# Patient Record
Sex: FEMALE | Race: WHITE | Employment: FULL TIME | URBAN - METROPOLITAN AREA
[De-identification: names, ages, dates, MRNs, and addresses within clinical notes are randomized per-mention and may not be internally consistent; named-entity substitution may affect disease eponyms.]

---

## 2019-04-24 PROBLEM — E66.01 OBESITY, MORBID (HCC): Status: ACTIVE | Noted: 2019-04-24

## 2020-10-15 NOTE — PROGRESS NOTES
Score is zero, no evidence of blockage, let me know if she wants something else for anxiety or higher dose of her effexor, often times when patients have reassurance it is not the heart, the pain eases off.

## 2020-10-15 NOTE — PROGRESS NOTES
cxr negative, likely chest wall (muscular pain) and not related to heart, see also note on calcium score, it was good news

## 2021-03-02 ENCOUNTER — TRANSCRIBE ORDER (OUTPATIENT)
Dept: SCHEDULING | Age: 41
End: 2021-03-02

## 2021-03-02 DIAGNOSIS — Z12.31 SCREENING MAMMOGRAM FOR HIGH-RISK PATIENT: Primary | ICD-10-CM

## 2021-03-22 ENCOUNTER — HOSPITAL ENCOUNTER (OUTPATIENT)
Dept: MAMMOGRAPHY | Age: 41
Discharge: HOME | End: 2021-03-22
Attending: FAMILY MEDICINE
Payer: COMMERCIAL

## 2021-03-22 DIAGNOSIS — Z12.31 SCREENING MAMMOGRAM FOR HIGH-RISK PATIENT: ICD-10-CM

## 2021-03-22 PROCEDURE — 77067 SCR MAMMO BI INCL CAD: CPT

## 2022-03-19 PROBLEM — E66.01 OBESITY, MORBID (HCC): Status: ACTIVE | Noted: 2019-04-24

## 2022-08-17 ENCOUNTER — OFFICE VISIT (OUTPATIENT)
Dept: FAMILY MEDICINE CLINIC | Facility: CLINIC | Age: 42
End: 2022-08-17
Payer: COMMERCIAL

## 2022-08-17 VITALS
HEIGHT: 63 IN | SYSTOLIC BLOOD PRESSURE: 132 MMHG | WEIGHT: 248 LBS | BODY MASS INDEX: 43.94 KG/M2 | DIASTOLIC BLOOD PRESSURE: 78 MMHG

## 2022-08-17 DIAGNOSIS — K21.9 GASTRO-ESOPHAGEAL REFLUX DISEASE WITHOUT ESOPHAGITIS: ICD-10-CM

## 2022-08-17 DIAGNOSIS — G43.C0 PERIODIC HEADACHE SYNDROME, NOT INTRACTABLE: ICD-10-CM

## 2022-08-17 DIAGNOSIS — E78.00 PURE HYPERCHOLESTEROLEMIA, UNSPECIFIED: ICD-10-CM

## 2022-08-17 DIAGNOSIS — E55.9 VITAMIN D DEFICIENCY, UNSPECIFIED: ICD-10-CM

## 2022-08-17 DIAGNOSIS — E66.01 OBESITY, MORBID (HCC): ICD-10-CM

## 2022-08-17 DIAGNOSIS — F41.1 GENERALIZED ANXIETY DISORDER: ICD-10-CM

## 2022-08-17 DIAGNOSIS — I10 ESSENTIAL HYPERTENSION, BENIGN: Primary | ICD-10-CM

## 2022-08-17 DIAGNOSIS — E11.9 TYPE 2 DIABETES MELLITUS WITHOUT COMPLICATION, WITHOUT LONG-TERM CURRENT USE OF INSULIN (HCC): ICD-10-CM

## 2022-08-17 DIAGNOSIS — J30.1 SEASONAL ALLERGIC RHINITIS DUE TO POLLEN: ICD-10-CM

## 2022-08-17 PROCEDURE — 99214 OFFICE O/P EST MOD 30 MIN: CPT | Performed by: FAMILY MEDICINE

## 2022-08-17 PROCEDURE — 3051F HG A1C>EQUAL 7.0%<8.0%: CPT | Performed by: FAMILY MEDICINE

## 2022-08-17 RX ORDER — SEMAGLUTIDE 1.34 MG/ML
INJECTION, SOLUTION SUBCUTANEOUS
Status: CANCELLED | OUTPATIENT
Start: 2022-08-17

## 2022-08-17 RX ORDER — CLORAZEPATE DIPOTASSIUM 7.5 MG/1
7.5 TABLET ORAL PRN
Qty: 90 TABLET | Refills: 1 | Status: SHIPPED | OUTPATIENT
Start: 2022-08-17 | End: 2022-11-15

## 2022-08-17 RX ORDER — SEMAGLUTIDE 1.34 MG/ML
INJECTION, SOLUTION SUBCUTANEOUS
COMMUNITY
Start: 2022-06-01 | End: 2022-08-17

## 2022-08-17 RX ORDER — LOSARTAN POTASSIUM AND HYDROCHLOROTHIAZIDE 25; 100 MG/1; MG/1
1 TABLET ORAL DAILY
Qty: 30 TABLET | Refills: 0 | Status: SHIPPED | OUTPATIENT
Start: 2022-08-17 | End: 2022-08-17 | Stop reason: SDUPTHER

## 2022-08-17 RX ORDER — ESOMEPRAZOLE MAGNESIUM 40 MG/1
40 CAPSULE, DELAYED RELEASE ORAL DAILY
Qty: 90 CAPSULE | Refills: 1 | Status: SHIPPED | OUTPATIENT
Start: 2022-08-17

## 2022-08-17 RX ORDER — ESOMEPRAZOLE MAGNESIUM 40 MG/1
40 CAPSULE, DELAYED RELEASE ORAL DAILY
Qty: 30 CAPSULE | Refills: 0 | Status: SHIPPED | OUTPATIENT
Start: 2022-08-17 | End: 2022-08-17 | Stop reason: SDUPTHER

## 2022-08-17 RX ORDER — CLORAZEPATE DIPOTASSIUM 7.5 MG/1
7.5 TABLET ORAL PRN
Qty: 30 TABLET | Refills: 0 | Status: SHIPPED | OUTPATIENT
Start: 2022-08-17 | End: 2022-08-17 | Stop reason: SDUPTHER

## 2022-08-17 RX ORDER — VENLAFAXINE HYDROCHLORIDE 150 MG/1
150 CAPSULE, EXTENDED RELEASE ORAL 2 TIMES DAILY
Qty: 90 CAPSULE | Refills: 1 | Status: SHIPPED | OUTPATIENT
Start: 2022-08-17

## 2022-08-17 RX ORDER — VENLAFAXINE HYDROCHLORIDE 150 MG/1
150 CAPSULE, EXTENDED RELEASE ORAL 2 TIMES DAILY
Qty: 30 CAPSULE | Refills: 0 | Status: SHIPPED | OUTPATIENT
Start: 2022-08-17 | End: 2022-08-17 | Stop reason: SDUPTHER

## 2022-08-17 RX ORDER — LOSARTAN POTASSIUM AND HYDROCHLOROTHIAZIDE 25; 100 MG/1; MG/1
1 TABLET ORAL DAILY
Qty: 90 TABLET | Refills: 1 | Status: SHIPPED | OUTPATIENT
Start: 2022-08-17

## 2022-08-17 RX ORDER — RIZATRIPTAN BENZOATE 10 MG/1
10 TABLET, ORALLY DISINTEGRATING ORAL
Qty: 10 TABLET | Refills: 3 | Status: SHIPPED | OUTPATIENT
Start: 2022-08-17 | End: 2022-08-17

## 2022-08-17 ASSESSMENT — ENCOUNTER SYMPTOMS
ABDOMINAL DISTENTION: 0
COUGH: 0
ANAL BLEEDING: 0
BLURRED VISION: 0
EYE ITCHING: 0
SINUS PRESSURE: 0
EYE DISCHARGE: 0
CHEST TIGHTNESS: 0
FACIAL SWELLING: 0
BACK PAIN: 0
BLOOD IN STOOL: 0
EYE PAIN: 0
RHINORRHEA: 0
SINUS PAIN: 0
EYE REDNESS: 0
ABDOMINAL PAIN: 0
APNEA: 0

## 2022-08-17 ASSESSMENT — PATIENT HEALTH QUESTIONNAIRE - PHQ9
2. FEELING DOWN, DEPRESSED OR HOPELESS: 0
1. LITTLE INTEREST OR PLEASURE IN DOING THINGS: 0
SUM OF ALL RESPONSES TO PHQ QUESTIONS 1-9: 0
SUM OF ALL RESPONSES TO PHQ QUESTIONS 1-9: 0
SUM OF ALL RESPONSES TO PHQ9 QUESTIONS 1 & 2: 0
SUM OF ALL RESPONSES TO PHQ QUESTIONS 1-9: 0
SUM OF ALL RESPONSES TO PHQ QUESTIONS 1-9: 0

## 2022-08-17 NOTE — PROGRESS NOTES
[address] [place] [address]  _______________________________________  Loree Shaw MD                 [address] [place] [address],  [address] MD Adithya                     [facility]                                                                                    Phone: [phone]                                                                                    Fax: [phone]    Chad Hauser is a 43 y.o. female who is seen for evaluation of   Chief Complaint   Patient presents with    Hypertension    Diabetes    Gastroesophageal Reflux       HPI:   Hypertension  This is a chronic problem. Progression since onset: Hypertension controlled medication needs refills recheck labs. Associated symptoms include headaches. Pertinent negatives include no blurred vision, chest pain or palpitations. There are no associated agents to hypertension. Diabetes  She presents for her follow-up diabetic visit. She has type 2 diabetes mellitus. Disease course: Diabetes controlled medication needs refills recheck labs. Hypoglycemia symptoms include headaches. Pertinent negatives for hypoglycemia include no confusion, dizziness, nervousness/anxiousness or pallor. Pertinent negatives for diabetes include no blurred vision, no chest pain, no fatigue, no foot ulcerations, no polydipsia and no polyphagia. Gastroesophageal Reflux  She reports no abdominal pain, no chest pain or no coughing. Chronicity: Acid reflux controlled medication needs refills recheck labs. Pertinent negatives include no fatigue. Other  Episode frequency: Obesity, patient continues to work on diet and exercise, details below, discussed in length. Associated symptoms include headaches. Pertinent negatives include no abdominal pain, arthralgias, chest pain, chills, congestion, coughing, diaphoresis, fatigue, fever, joint swelling, myalgias or rash. Headache   This is a recurrent problem.  Episode onset: Patient having more severe headaches in the last few months, migraines, she uses Excedrin and has to take nausea medicines. Pertinent negatives include no abdominal pain, back pain, blurred vision, coughing, dizziness, ear pain, eye pain, eye redness, fever, hearing loss, rhinorrhea or sinus pressure. Chief Complaint   Patient presents with    Hypertension    Diabetes    Gastroesophageal Reflux         Review of Systems:    Review of Systems   Constitutional:  Negative for activity change, appetite change, chills, diaphoresis, fatigue and fever. HENT:  Negative for congestion, ear discharge, ear pain, facial swelling, hearing loss, mouth sores, rhinorrhea, sinus pressure and sinus pain. Eyes:  Negative for blurred vision, pain, discharge, redness and itching. Respiratory:  Negative for apnea, cough and chest tightness. Cardiovascular:  Negative for chest pain, palpitations and leg swelling. Gastrointestinal:  Negative for abdominal distention, abdominal pain, anal bleeding and blood in stool. Endocrine: Negative for cold intolerance, heat intolerance, polydipsia and polyphagia. Genitourinary:  Negative for difficulty urinating, dysuria, enuresis, flank pain, frequency and hematuria. Musculoskeletal:  Negative for arthralgias, back pain, gait problem, joint swelling and myalgias. Skin:  Negative for pallor and rash. Neurological:  Positive for headaches. Negative for dizziness, facial asymmetry and light-headedness. Psychiatric/Behavioral:  Negative for agitation, behavioral problems, confusion and sleep disturbance. The patient is not nervous/anxious.       History:  Past Medical History:   Diagnosis Date    Acid reflux     Acute bacterial sinusitis     Allergic rhinitis due to pollen     Anemia     hx of    Anxiety state, unspecified     Asthma     inhaler PRN    Chronic sinusitis     Cough     Deviated nasal septum     Endometriosis     Essential hypertension, benign     no medications at this time    Fibrocystic breast     GERD (gastroesophageal reflux disease)     managed with medication    Headache     High blood pressure     Hypertrophy of nasal turbinates     Nasal obstruction     Nausea & vomiting     Other ill-defined conditions(349.89)     current sinus infection of 10 week duration    Ovarian cyst     PCOS (polycystic ovarian syndrome)     Pelvic inflammation in female     Pneumonia 2002    RLL, hospitalized    Preeclampsia     Sinus pain     Thyroid disease     reports small nodule - no medication or surgery    Unspecified adverse effect of anesthesia     difficulty voiding after having anesthesia    URI, acute     UTI (urinary tract infection)        Past Surgical History:   Procedure Laterality Date    CHOLECYSTECTOMY      COLONOSCOPY      normal no polyps, Dr Pierce [place], [address] (CERVIX STATUS UNKNOWN)      PELVIC LAPAROSCOPY      SEPTOPLASTY  1999?    septoplasty w/ bone spur removal, turbinates shaved;  [facility]       Family History   Problem Relation Age of Onset    Colon Cancer Mother     Cancer Mother         pancreatic cancer    High Cholesterol Mother     Breast Cancer Other 28        mat cousin    Lung Disease Neg Hx     Breast Cancer Maternal Aunt     Heart Disease Father        Social History     Tobacco Use    Smoking status: Never    Smokeless tobacco: Never   Substance Use Topics    Alcohol use: No       Allergies   Allergen Reactions    Leuprolide Anaphylaxis and Swelling     Difficulty breathing.     Aspirin Other (See Comments)     Childhood allergy- can take now    Erythromycin Other (See Comments)     As a chil, allergy to Mycin family    Hydromorphone Itching    Penicillins Other (See Comments)     Can take augmentin without difficulty    Sulfa Antibiotics Other (See Comments)     As a child    Sulfamethoxazole-Trimethoprim Other (See Comments)       Current Outpatient Medications   Medication Sig Dispense Refill clorazepate (TRANXENE) 7.5 MG tablet Take 1 tablet by mouth as needed for Anxiety for up to 30 days. 30 tablet 0    esomeprazole (NEXIUM) 40 MG delayed release capsule Take 1 capsule by mouth daily 30 capsule 0    losartan-hydroCHLOROthiazide (HYZAAR) 100-25 MG per tablet Take 1 tablet by mouth daily TAKE 1 TABLET DAILY 30 tablet 0    metFORMIN (GLUCOPHAGE) 500 MG tablet Take 1 tablet by mouth daily (with breakfast) 60 tablet 0    venlafaxine (EFFEXOR XR) 150 MG extended release capsule Take 1 capsule by mouth 2 times daily 30 capsule 0    Semaglutide, 2 MG/DOSE, 8 MG/3ML SOPN Inject 2 mg into the skin once a week 3 mL 2    rizatriptan (MAXALT-MLT) 10 MG disintegrating tablet Take 1 tablet by mouth once as needed for Migraine May repeat in 2 hours if needed 10 tablet 3    cetirizine (ZYRTEC) 10 MG tablet Take by mouth      vitamin D 25 MCG (1000 UT) CAPS Take by mouth daily       No current facility-administered medications for this visit. Vitals:    /78   Ht 5' 3\" (1.6 m)   Wt 248 lb (112.5 kg)   BMI 43.93 kg/m²     Physical Exam:  Physical Exam  Constitutional:       Appearance: Normal appearance. She is obese. She is not ill-appearing or diaphoretic. HENT:      Head: Normocephalic. Right Ear: Tympanic membrane normal.      Left Ear: Tympanic membrane normal.      Nose: No congestion or rhinorrhea. Cardiovascular:      Rate and Rhythm: Normal rate and regular rhythm. Pulses: Normal pulses. Heart sounds: Normal heart sounds. Pulmonary:      Effort: Pulmonary effort is normal.      Breath sounds: Normal breath sounds. Musculoskeletal:         General: Normal range of motion. Cervical back: Normal range of motion and neck supple. Skin:     General: Skin is warm and dry. Neurological:      Mental Status: She is alert and oriented to person, place, and time. Assessment/Plan:     ICD-10-CM    1.  Essential hypertension, benign  I10 losartan-hydroCHLOROthiazide (HYZAAR) 100-25 MG per tablet      2. Seasonal allergic rhinitis due to pollen  J30.1       3. Obesity, morbid ([address]  E66.01       4. Type 2 diabetes mellitus without complication, without long-term current use of insulin (HCC)  E11.9 metFORMIN (GLUCOPHAGE) 500 MG tablet     Semaglutide, 2 MG/DOSE, 8 MG/3ML SOPN      5. Generalized anxiety disorder  F41.1 clorazepate (TRANXENE) 7.5 MG tablet     venlafaxine (EFFEXOR XR) 150 MG extended release capsule      6. Gastro-esophageal reflux disease without esophagitis  K21.9 esomeprazole (NEXIUM) 40 MG delayed release capsule      7. Pure hypercholesterolemia, unspecified  E78.00       8. Vitamin D deficiency, unspecified  E55.9       Patient was given samples and prescription for Aimovig today, also prescription for Maxalt 10 mg MLT with instructions for use of all. She demonstrated appropriate use of the first shot of the Hemabate in the office.   Meds sent, we are increasing ozempic to 2 mg just discontinue  Labs sent  Encourage diet and exercise   Encourage weight loss,  Encourage home sugar monitoring  Call if problems  Recheck 3 months     Erica Grier MD

## 2022-08-24 ENCOUNTER — HOSPITAL ENCOUNTER (OUTPATIENT)
Dept: MAMMOGRAPHY | Age: 42
Discharge: HOME | End: 2022-08-27
Payer: COMMERCIAL

## 2022-08-24 DIAGNOSIS — Z12.31 VISIT FOR SCREENING MAMMOGRAM: ICD-10-CM

## 2022-08-24 PROCEDURE — 77067 SCR MAMMO BI INCL CAD: CPT

## 2022-09-14 ENCOUNTER — OFFICE VISIT (OUTPATIENT)
Dept: ORTHOPEDIC SURGERY | Age: 42
End: 2022-09-14
Payer: COMMERCIAL

## 2022-09-14 DIAGNOSIS — M25.562 LEFT KNEE PAIN, UNSPECIFIED CHRONICITY: Primary | ICD-10-CM

## 2022-09-14 PROCEDURE — 99203 OFFICE O/P NEW LOW 30 MIN: CPT | Performed by: SPECIALIST/TECHNOLOGIST

## 2022-09-14 RX ORDER — COVID-19 ANTIGEN TEST
KIT MISCELLANEOUS
COMMUNITY
Start: 2022-08-23

## 2022-09-14 RX ORDER — LANCETS
EACH MISCELLANEOUS
COMMUNITY
Start: 2022-08-18

## 2022-09-14 RX ORDER — DICLOFENAC SODIUM 75 MG/1
75 TABLET, DELAYED RELEASE ORAL 2 TIMES DAILY
Qty: 60 TABLET | Refills: 0 | Status: SHIPPED | OUTPATIENT
Start: 2022-09-14

## 2022-09-14 RX ORDER — ONDANSETRON 8 MG/1
TABLET, ORALLY DISINTEGRATING ORAL
COMMUNITY
Start: 2022-08-09

## 2022-09-14 NOTE — PROGRESS NOTES
Name: Mitzi Cornell  YOB: 1980  Gender: female  MRN: [id number]      CC: New Patient (L knee pain)       HPI: Mitzi Cornell is a 43 y.o. female who presents with New Patient (L knee pain)  With 3-1/2 months of left knee pain with no mechanism of injury. She reports swelling and stiffness in the knee and tenderness to touch. She reports the pain is constant and achy. She reports that she has difficulty with flexing and crossing her legs. She reports that she has pain with deep squat and going up and down stairs. She reports that she has had physical therapy and cortisone injections in the past with good relief but this was several years ago. ROS/Meds/PSH/PMH/FH/SH: I personally reviewed the patients standard intake form. Below are the pertinents    Tobacco:  reports that she has never smoked. She has never used smokeless tobacco.  Diabetes: diabetic-non insulin  Other: HTN, GERD, PCOS, thyroid disease    Physical Examination:  General: no acute distress  Lungs: breathing easily  CV: regular rhythm by pulse  Left Knee: Moderate effusion present. Tenderness to palpation of the medial joint line. Full active and passive range of motion with pain in the extremes. Negative ligamentous exam x4. Positive Talia's with reproduction of patient's symptoms in the medial joint line. Positive bounce home test.      Imaging:   Knee XR: 4 views     Clinical Indication  1. Left knee pain, unspecified chronicity           Report: AP, lateral, PA flexion, sunrise views of the Left knee demonstrates no acute fracture dislocation, or advanced degenerative changes    Impression: No acute findings as above            All imaging interpreted by myself SERVANDO Jaimes independent of radiology review        Assessment:   1. Left knee pain, unspecified chronicity         Plan: Think the majority of the patient's left knee pain is due to a meniscal pathology.   I discussed with the patient conservative treatment options to include corticosteroid injection, formal physical therapy and prescription NSAIDs. I think based on the fact that she is having mechanical symptoms and with findings on clinical exam it is reasonable to forego any corticosteroid injection or formal physical therapy at this time. I will prescribe her 75 mg diclofenac twice daily for her pain and swelling. At this time I will order an MRI to evaluate this further and she will return after MRI for definitive treatment.         Chas De La Vega MS, APRN, FNP-BC  Orthopaedics and Sports Medicine

## 2022-09-21 ENCOUNTER — OFFICE VISIT (OUTPATIENT)
Dept: ORTHOPEDIC SURGERY | Age: 42
End: 2022-09-21
Payer: COMMERCIAL

## 2022-09-21 DIAGNOSIS — M25.562 LEFT KNEE PAIN, UNSPECIFIED CHRONICITY: Primary | ICD-10-CM

## 2022-09-21 DIAGNOSIS — S83.242D TEAR OF MEDIAL MENISCUS OF LEFT KNEE, CURRENT, UNSPECIFIED TEAR TYPE, SUBSEQUENT ENCOUNTER: ICD-10-CM

## 2022-09-21 PROCEDURE — 99214 OFFICE O/P EST MOD 30 MIN: CPT | Performed by: SPECIALIST/TECHNOLOGIST

## 2022-09-21 NOTE — PROGRESS NOTES
Name: Khadar Cole  YOB: 1980  Gender: female  MRN: [id number]      CC: Follow-up (L knee MRI f/u)       HPI: Khadar Cole is a 43 y.o. female who returns for a follow up with MRI results on left knee pain. She reports continued pain with no improvement. Physical Examination:  General: no acute distress  Lungs: breathing easily  CV: regular rhythm by pulse  Left Knee: Minimal effusion present. Tenderness to palpation of the medial joint line. Full active and passive range of motion with pain in the extremes. Negative ligamentous exam x4. Positive Talia's with reproduction of patient's symptoms medial joint line. Positive bounce home test.    Imaging: I reviewed an MRI in our system performed on 9/20/2022 which shows a vertical tear at the junction of the posterior horn and root of the medial meniscus. Intact MCL with mild periligamentous edema which may reflect a low-grade sprain. Mild proximal patellar tendinosis. Patella chondrosis including moderate to high-grade thinning along the medial and patellar ridge with underlying subchondral edema. Medial compartment chondrosis. Assessment:   1. Left knee pain, unspecified chronicity         Plan:   I discussed the MRI results with the patient informing her that she did have a vertical tear at the junction of the posterior horn and root of the medial meniscus which correlates with her clinical exam.  I discussed with the patient conservative treatment versus surgical intervention. At this time I would recommend she be evaluated further by an orthopedic surgeon to discuss potential surgery to address her meniscal tear. I did discuss with the patient the rehab and recovery expected with a meniscectomy versus a meniscal repair. I will refer her to Dr. Zac Tejada for surgical consultation.     Alejandra Victoria, CRISTINA - CNP    Orthopaedics and Sports Medicine

## 2022-10-03 NOTE — TELEPHONE ENCOUNTER
Message from [address]; Review Type:Prior Auth; Coverage Start Date:09/03/2022; Coverage End Date:10/03/2023; Pt needs Rx sent to pharmacy; samples worked for her.  Would like 30 day supply and also 90 day sent to mail order

## 2022-10-04 RX ORDER — ERENUMAB-AOOE 70 MG/ML
70 INJECTION SUBCUTANEOUS
Qty: 1 ADJUSTABLE DOSE PRE-FILLED PEN SYRINGE | Refills: 0 | Status: SHIPPED | OUTPATIENT
Start: 2022-10-04

## 2022-10-04 RX ORDER — ERENUMAB-AOOE 70 MG/ML
70 INJECTION SUBCUTANEOUS
Qty: 3 ADJUSTABLE DOSE PRE-FILLED PEN SYRINGE | Refills: 1 | Status: SHIPPED | OUTPATIENT
Start: 2022-10-04

## 2022-10-10 ENCOUNTER — OFFICE VISIT (OUTPATIENT)
Dept: ORTHOPEDIC SURGERY | Age: 42
End: 2022-10-10
Payer: COMMERCIAL

## 2022-10-10 DIAGNOSIS — M25.562 LEFT KNEE PAIN, UNSPECIFIED CHRONICITY: Primary | ICD-10-CM

## 2022-10-10 DIAGNOSIS — S83.242A ACUTE MEDIAL MENISCUS TEAR, LEFT, INITIAL ENCOUNTER: ICD-10-CM

## 2022-10-10 PROCEDURE — 99204 OFFICE O/P NEW MOD 45 MIN: CPT | Performed by: ORTHOPAEDIC SURGERY

## 2022-10-10 NOTE — PROGRESS NOTES
Name: Elsy Chance  YOB: 1980  Gender: female  MRN: [id number]      CC: New Patient (L knee pain)       HPI: Elsy Chance is a 43 y.o. female who presents with New Patient (L knee pain)  Mrs. Emma Cruz is a new patient who presents today with left knee pain. She was referred to me by SERVANDO Rose for a surgical consultation. She reports persistent pain and stiffness for the past 4 months with no mechanism of injury. She notes difficulty with deep squatting and with climbing stairs. She has received significant relief from cortisone injections and formal physical therapy in the past. She presents today with xrays and an MRI for my review. Current Outpatient Medications:     Erenumab-aooe (AIMOVIG) 70 MG/ML SOAJ, Inject 70 mg into the skin every 30 days, Disp: 1 Adjustable Dose Pre-filled Pen Syringe, Rfl: 0    Erenumab-aooe (AIMOVIG) 70 MG/ML SOAJ, Inject 70 mg into the skin every 30 days, Disp: 3 Adjustable Dose Pre-filled Pen Syringe, Rfl: 1    ondansetron (ZOFRAN-ODT) 8 MG TBDP disintegrating tablet, TAKE 1 TABLET BY MOUTH EVERY 8 HOURS AS NEEDED FOR NAUSEA AND VOMITING, Disp: , Rfl:     FLOWFLEX COVID-19 AG HOME TEST KIT, , Disp: , Rfl:     Accu-Chek Softclix Lancets MISC, USE TO CHECK BLOOD SUGAR DAILY, Disp: , Rfl:     diclofenac (VOLTAREN) 75 MG EC tablet, Take 1 tablet by mouth 2 times daily, Disp: 60 tablet, Rfl: 0    rizatriptan (MAXALT-MLT) 10 MG disintegrating tablet, Take 1 tablet by mouth once as needed for Migraine May repeat in 2 hours if needed, Disp: 10 tablet, Rfl: 3    Semaglutide, 2 MG/DOSE, 8 MG/3ML SOPN, Inject 2 mg into the skin once a week, Disp: 9 mL, Rfl: 2    clorazepate (TRANXENE) 7.5 MG tablet, Take 1 tablet by mouth as needed for Anxiety for up to 90 days. , Disp: 90 tablet, Rfl: 1    esomeprazole (NEXIUM) 40 MG delayed release capsule, Take 1 capsule by mouth daily, Disp: 90 capsule, Rfl: 1    losartan-hydroCHLOROthiazide (HYZAAR) 100-25 MG per tablet, Take 1 tablet by mouth daily TAKE 1 TABLET DAILY, Disp: 90 tablet, Rfl: 1    metFORMIN (GLUCOPHAGE) 500 MG tablet, Take 1 tablet by mouth daily (with breakfast), Disp: 180 tablet, Rfl: 1    venlafaxine (EFFEXOR XR) 150 MG extended release capsule, Take 1 capsule by mouth 2 times daily, Disp: 90 capsule, Rfl: 1    cetirizine (ZYRTEC) 10 MG tablet, Take by mouth, Disp: , Rfl:     vitamin D 25 MCG (1000 UT) CAPS, Take by mouth daily, Disp: , Rfl:   Allergies   Allergen Reactions    Leuprolide Anaphylaxis and Swelling     Difficulty breathing.     Hydromorphone Itching    Sulfamethoxazole-Trimethoprim Other (See Comments)     Past Medical History:   Diagnosis Date    Acid reflux     Acute bacterial sinusitis     Allergic rhinitis due to pollen     Anemia     hx of    Anxiety state, unspecified     Asthma     inhaler PRN    Chronic sinusitis     Cough     Deviated nasal septum     Endometriosis     Essential hypertension, benign     no medications at this time    Fibrocystic breast     GERD (gastroesophageal reflux disease)     managed with medication    Headache     High blood pressure     Hypertrophy of nasal turbinates     Nasal obstruction     Nausea & vomiting     Other ill-defined conditions(799.89)     current sinus infection of 10 week duration    Ovarian cyst     PCOS (polycystic ovarian syndrome)     Pelvic inflammation in female     Pneumonia 2002    RLL, hospitalized    Preeclampsia     Sinus pain     Thyroid disease     reports small nodule - no medication or surgery    Unspecified adverse effect of anesthesia     difficulty voiding after having anesthesia    URI, acute     UTI (urinary tract infection)      Past Surgical History:   Procedure Laterality Date    CHOLECYSTECTOMY      COLONOSCOPY      normal no polyps, Dr Mery Thorpe, [address] (CERVIX STATUS UNKNOWN)      PELVIC LAPAROSCOPY      SEPTOPLASTY  1999?    septoplasty w/ bone spur potentially complete. There is also some mild to moderate chondromalacia of the medial femoral condyle and some mild medial extrusion of the meniscal body  All imaging interpreted by myself Roger oMrrissey MD independent of radiology review        Assessment:     ICD-10-CM    1. Left knee pain, unspecified chronicity  M25.562 Ambulatory referral to Orthopedic Surgery      2. Acute medial meniscus tear, left, initial encounter  S83.242A Ambulatory referral to Orthopedic Surgery          Plan:   Acute medial meniscal root tear. We discussed the details and the severity of the injury and treatment options which include nonoperative management versus an intra-articular injection versus knee arthroscopy with debridement versus medial meniscus root repair. We discussed the progression of arthritis with a debridement operation. She desires to have this addressed surgically which I think is reasonable. After a thorough understanding of the pros and cons we will proceed with a meniscus repair versus a debridement. She understands the extensive postoperative course associated with the procedure as well as possibility of failure and still worsening progressive degenerative changes. Surgical plan to be for left knee arthroscopy medial meniscus repair versus debridement              Roger Morrissey MD, [address] [place] [address] and Sports Medicine

## 2022-10-11 DIAGNOSIS — S83.242A ACUTE MEDIAL MENISCUS TEAR, LEFT, INITIAL ENCOUNTER: Primary | ICD-10-CM

## 2022-10-11 DIAGNOSIS — M25.562 LEFT KNEE PAIN, UNSPECIFIED CHRONICITY: ICD-10-CM

## 2022-10-28 RX ORDER — SEMAGLUTIDE 1.34 MG/ML
INJECTION, SOLUTION SUBCUTANEOUS
Qty: 3 ML | Refills: 3 | Status: SHIPPED | OUTPATIENT
Start: 2022-10-28

## 2022-11-14 ENCOUNTER — OFFICE VISIT (OUTPATIENT)
Dept: ORTHOPEDIC SURGERY | Age: 42
End: 2022-11-14
Payer: COMMERCIAL

## 2022-11-14 ENCOUNTER — HOSPITAL ENCOUNTER (OUTPATIENT)
Dept: LAB | Age: 42
Discharge: HOME | End: 2022-11-17
Payer: COMMERCIAL

## 2022-11-14 DIAGNOSIS — S83.242D TEAR OF MEDIAL MENISCUS OF LEFT KNEE, CURRENT, UNSPECIFIED TEAR TYPE, SUBSEQUENT ENCOUNTER: Primary | ICD-10-CM

## 2022-11-14 DIAGNOSIS — M25.562 LEFT KNEE PAIN, UNSPECIFIED CHRONICITY: ICD-10-CM

## 2022-11-14 LAB
HGB BLD-MCNC: 11.2 G/DL (ref 11.7–15.4)
POTASSIUM SERPL-SCNC: 3.4 MMOL/L (ref 3.5–5.1)

## 2022-11-14 PROCEDURE — L1832 KO ADJ JNT POS R SUP PRE CST: HCPCS | Performed by: SPECIALIST/TECHNOLOGIST

## 2022-11-14 PROCEDURE — 84132 ASSAY OF SERUM POTASSIUM: CPT

## 2022-11-14 PROCEDURE — 85018 HEMOGLOBIN: CPT

## 2022-11-14 PROCEDURE — 36415 COLL VENOUS BLD VENIPUNCTURE: CPT

## 2022-11-14 PROCEDURE — E0114 CRUTCH UNDERARM PAIR NO WOOD: HCPCS | Performed by: SPECIALIST/TECHNOLOGIST

## 2022-11-14 PROCEDURE — 99024 POSTOP FOLLOW-UP VISIT: CPT | Performed by: SPECIALIST/TECHNOLOGIST

## 2022-11-14 PROCEDURE — E0218 FLUID CIRC COLD PAD W PUMP: HCPCS | Performed by: SPECIALIST/TECHNOLOGIST

## 2022-11-14 RX ORDER — OXYCODONE HYDROCHLORIDE 5 MG/1
5 TABLET ORAL EVERY 4 HOURS PRN
Qty: 30 TABLET | Refills: 0 | Status: SHIPPED | OUTPATIENT
Start: 2022-11-14 | End: 2022-11-19

## 2022-11-14 RX ORDER — ACETAMINOPHEN 500 MG
1000 TABLET ORAL 2 TIMES DAILY
COMMUNITY
Start: 2022-11-15 | End: 2022-11-15

## 2022-11-14 NOTE — PROGRESS NOTES
Name: Ernest Sacks  YOB: 1980  Gender: female  MRN: [id number]      CC: Follow-up (L knee pre-op)       HPI: Ernest Sacks is a 43 y.o. female who returns for a follow up preoperative appointment on left knee pain. She is scheduled for a Left Knee Arthroscopy Meniscal Repair with Dr. Emma Pierre on 11/16/2022. She reports continued left knee pain and would like to proceed with surgery. Current Outpatient Medications:     oxyCODONE (ROXICODONE) 5 MG immediate release tablet, Take 1 tablet by mouth every 4 hours as needed for Pain for up to 5 days. Intended supply: 5 days. Take lowest dose possible to manage pain, Disp: 30 tablet, Rfl: 0    OZEMPIC, 1 MG/DOSE, 4 MG/3ML SOPN, INJECT 1 MG UNDER THE SKIN EVERY 7 DAYS, Disp: 3 mL, Rfl: 3    Erenumab-aooe (AIMOVIG) 70 MG/ML SOAJ, Inject 70 mg into the skin every 30 days, Disp: 1 Adjustable Dose Pre-filled Pen Syringe, Rfl: 0    Erenumab-aooe (AIMOVIG) 70 MG/ML SOAJ, Inject 70 mg into the skin every 30 days, Disp: 3 Adjustable Dose Pre-filled Pen Syringe, Rfl: 1    ondansetron (ZOFRAN-ODT) 8 MG TBDP disintegrating tablet, TAKE 1 TABLET BY MOUTH EVERY 8 HOURS AS NEEDED FOR NAUSEA AND VOMITING, Disp: , Rfl:     FLOWFLEX COVID-19 AG HOME TEST KIT, , Disp: , Rfl:     Accu-Chek Softclix Lancets MISC, USE TO CHECK BLOOD SUGAR DAILY, Disp: , Rfl:     diclofenac (VOLTAREN) 75 MG EC tablet, Take 1 tablet by mouth 2 times daily, Disp: 60 tablet, Rfl: 0    rizatriptan (MAXALT-MLT) 10 MG disintegrating tablet, Take 1 tablet by mouth once as needed for Migraine May repeat in 2 hours if needed, Disp: 10 tablet, Rfl: 3    Semaglutide, 2 MG/DOSE, 8 MG/3ML SOPN, Inject 2 mg into the skin once a week, Disp: 9 mL, Rfl: 2    clorazepate (TRANXENE) 7.5 MG tablet, Take 1 tablet by mouth as needed for Anxiety for up to 90 days. , Disp: 90 tablet, Rfl: 1    esomeprazole (NEXIUM) 40 MG delayed release capsule, Take 1 capsule by mouth daily, Disp: 90 capsule, Rfl: 1 losartan-hydroCHLOROthiazide (HYZAAR) 100-25 MG per tablet, Take 1 tablet by mouth daily TAKE 1 TABLET DAILY, Disp: 90 tablet, Rfl: 1    metFORMIN (GLUCOPHAGE) 500 MG tablet, Take 1 tablet by mouth daily (with breakfast), Disp: 180 tablet, Rfl: 1    venlafaxine (EFFEXOR XR) 150 MG extended release capsule, Take 1 capsule by mouth 2 times daily, Disp: 90 capsule, Rfl: 1    cetirizine (ZYRTEC) 10 MG tablet, Take by mouth, Disp: , Rfl:     vitamin D 25 MCG (1000 UT) CAPS, Take by mouth daily, Disp: , Rfl:   Allergies   Allergen Reactions    Leuprolide Anaphylaxis and Swelling     Difficulty breathing.     Hydromorphone Itching    Sulfamethoxazole-Trimethoprim Other (See Comments)     Past Medical History:   Diagnosis Date    Acid reflux     Acute bacterial sinusitis     Allergic rhinitis due to pollen     Anemia     hx of    Anxiety state, unspecified     Asthma     inhaler PRN    Chronic sinusitis     Cough     Deviated nasal septum     Endometriosis     Essential hypertension, benign     no medications at this time    Fibrocystic breast     GERD (gastroesophageal reflux disease)     managed with medication    Headache     High blood pressure     Hypertrophy of nasal turbinates     Nasal obstruction     Nausea & vomiting     Other ill-defined conditions(799.89)     current sinus infection of 10 week duration    Ovarian cyst     PCOS (polycystic ovarian syndrome)     Pelvic inflammation in female     Pneumonia 2002    RLL, hospitalized    Preeclampsia     Sinus pain     Thyroid disease     reports small nodule - no medication or surgery    Unspecified adverse effect of anesthesia     difficulty voiding after having anesthesia    URI, acute     UTI (urinary tract infection)      Past Surgical History:   Procedure Laterality Date    CHOLECYSTECTOMY      COLONOSCOPY      normal no polyps, Dr Caitie Ribera, [address] (CERVIX STATUS UNKNOWN)      PELVIC LAPAROSCOPY      SEPTOPLASTY  1999?    septoplasty w/ bone spur removal, turbinates shaved; Dr. Kayla Mulligan @ [facility]     Family History   Problem Relation Age of Onset    Colon Cancer Mother     Cancer Mother         pancreatic cancer    High Cholesterol Mother     Breast Cancer Other 28        mat cousin    Lung Disease Neg Hx     Breast Cancer Maternal Aunt     Heart Disease Father      Social History     Socioeconomic History    Marital status:      Spouse name: Not on file    Number of children: Not on file    Years of education: Not on file    Highest education level: Not on file   Occupational History    Not on file   Tobacco Use    Smoking status: Never    Smokeless tobacco: Never   Substance and Sexual Activity    Alcohol use: No    Drug use: No    Sexual activity: Not on file   Other Topics Concern    Not on file   Social History Narrative     and lives with her . Two children. She does not smoke. No ETOH use. Works as [employer] at [employer] Strain: Not on [employer] Insecurity: Not on file   Transportation Needs: Not on file   Physical Activity: Not on file   Stress: Not on file   Social Connections: Not on file   Intimate Partner Violence: Not on file   Housing Stability: Not on file         Physical Examination:  General: no acute distress  Lungs: breathing easily  HEENT: NCAT  Abdomen: soft  CV: regular rhythm by pulse  Left Knee: Minimal effusion present. Tenderness to palpation medial joint line. Full active and passive range of motion with pain in the extremes. Negative ligamentous exam x4. Positive Talia's, bounce home test.        Assessment:   1. Tear of medial meniscus of left knee, current, unspecified tear type, subsequent encounter    2. Left knee pain, unspecified chronicity         Plan:   Surgical plan will be for medial meniscus repair versus debridement.   Discussed with the patient the rehab and recovery course depending upon whether she has any debridement or repair. I did discuss with the patient the importance of bringing all equipment with her to the day of surgery for placement in the OR. I provided her with a postoperative pain prescription and encouraged her to pick it up prior to surgery to prevent expiration and not to take the medication until after surgery. We discussed the risks of the procedure including but not limited to infection, bleeding, anesthetic complications postoperative DVT/PE. All of his questions have been answered and they elect to proceed as planned.       Sloan Pizano APRN - CNP    Orthopaedics and Sports Medicine

## 2022-11-14 NOTE — PROGRESS NOTES
The brace was properly aligned medially and laterally along the length of the left Knee with the extension/flexion dials placed slightly above the patella (to insure that if brace slides down slightly the dials will still line up on either side of the patella/knee region). The brace is extended/shorten to the patient's leg length and to insure proper fit of the brace. The straps are tightened starting with the most distal strap and then strap proximal to the patella. The strap right below the patella is then secured and last is the strap highest on the quad. The straps are then trimmed for easier tightening of the brace for the patient. Patient was fitted and instruted on the use of a crutches. The crutches  was adjusted to the patient's height and arm length. Patient walked around with the crutches  to insure it was set at a comfortable height. I explained that patient needs tennis shoes on when using the crutches to insure no injury's . I explained and demonstrated all information to the patient about how to properly use the machine. It will be used to help reduce pain and swelling, in turn facilitate healing and speed up the rehabilitation process. The patient was instructed on how to properly fill the machine with ice and water as well as the importance to ALWAYS use a barrier between your skin and the cold pad to avoid additional injury. The patient was instructed to take the machine to the hospital with them the morning of their surgery. Patient was advised that if they had any questions about the Ice Man Machine or how to properly use it to please call me at [phone]. Patient read and signed documenting they understand and agree to [facility] current DME return policy.

## 2022-11-14 NOTE — PERIOP NOTE
Phone pre-assessment completed. Verified name&  . Order to obtain consent NOT found in EHR, however patient verifies case posting. Type 1B surgery,  assessment complete. Orders NOT received. Labs per surgeon: unknown  Labs per anesthesia protocol: HGB and Potassium- signed and held for PAT. Chart marked for charge nurse review. Medical/surgical history questions answered at their best of ability. All prior to admission medications reviewed and documented in [facility]. Instructed to take ONLY THE FOLLOWING MEDICATIONS ON THE DAY OF SURGERY according to anesthesia guidelines with sips of water: zyrtec, clorazepate, nexium, venlafaxine, if needed zofran. Verbalizes understanding to HOLD THE FOLLOWING MEDICATIONS: diclofenac    VERBALIZES UNDERSTANDING TO HOLD ALL VITAMINS AND SUPPLEMENTS 7 DAYS PRIOR TO SURGERY DATE (with exception to Renal Vitamins) and NSAIDS 5 DAYS PRIOR TO SURGERY DATE PER ANESTHESIA PROTOCOL. Instructed on the following:    > Arrive at [facility], time of arrival to be called the day before by 1700  > NPO after midnight including gum, mints, and ice chips  > Responsible adult must drive patient to the hospital, stay during surgery, and patient will need supervision 24 hours after anesthesia  > Use antibacterial soap in shower the night before surgery and on the morning of surgery  > All piercings must be removed prior to arrival.    > Leave all valuables (money and jewelry) at home but bring insurance card and ID on DOS.   > You may be required to pay a deductible or co-pay on the day of your procedure. You can pre-pay by calling [phone] if your surgery is at the [facility] or [phone] if your surgery is at the [facility]. > Do not wear make-up, nail polish, lotions, cologne, perfumes, powders, or oil on skin. Artificial nails are not permitted. Teach back successful and demonstrates knowledge of instruction.     You will received a call from the pre-op nurse by 5 pm on the business day prior to the scheduled procedure. If you have not spoken with a nurse, please check your voicemail. If you have not received an arrival time by 5 pm, please call [phone].

## 2022-11-14 NOTE — LETTER
Supplier Standards and have reviewed them.  I have received the prescribed item and have been fully instructed on the proper use of the above services/products.    ______ (Patient Initials) I understand that all DME items are non-returnable after being dispensed. Items still in sealed packaging may be returned up to 14 days after purchasing. [address] will replace items that are defective.    ______ (Patient Initials) I understand that [facility] will not file a claim with my insurance carrier for this service/product and I am waiving my right to file a claim on my own for this service/product with my insurance company as this item is NON-COVERED (Denoted by the **) by my Insurance company/policy. ______ (Patient Initials) I understand that I am responsible to bring my equipment to the hospital for any surgery. ______________________________________________  _______________________  Patient / Yanni Kay            Thank you for considering [address] Your physician has prescribed specific medical equipment or devices for your home use. The following describes your rights and responsibilities as our customer. Right to Choose Providers: You have a choice regarding which company supplies your home medical equipment and devices, and to consult your physician in this decision. You may choose a medical supply store, a home medical equipment provider, or a specialist such as [facility]. [facility] will coordinate with your physician to provide the medical equipment or devices prescribed for your home use. Right to Service:  You have the right to considerate, respectful and nondiscriminatory care. You have the right to receive accurate and easily understood information about your health care.   If you speak a foreign language, or don't understand the discussions, assistance will be provided to allow you to make informed health care decisions. You have the right to know your treatment options and to participate in decisions about your care, including the right to accept or refuse treatment. You have the right to expect a reasonable response to your requests for treatment or service. You have the right to talk in confidence with health care providers and to have your health care information protected. You have the right to receive an explanation of your bill. You have the right to complain about the service or product you receive. Patient Responsibilities:  Please provide complete and accurate information about your health insurance benefits and make arrangements for the timely payment of your bill. [facility] will, if possible, assume responsibility for billing your insurance (Medicare, Medicaid and commercial) for the prescribed equipment or devices. If your policy does not cover the prescribed product, or only covers a portion of the bill, you are responsible for any remaining balance. Return and Exchange Policy:  [facility] will honor published  Warranties for products. [facility] will accept returns or exchanges within 14 days from the date of receipt, providin) the product must be in new condition; 2) receipt as required; and 3) used disposable and hygiene products may only be returned due to a defective product. Note: Refunds will be issued in a timely manner, please allow 4-6 weeks for processing. Complaint Procedures and DME Consumer Protection Resources:  [facility] values you as a customer, and is committed to resolving patient concerns. This commitment includes understanding and documenting your concerns, conducting a review of internal procedures, and providing you with an explanation and resolution to your concerns.   Should you have any questions about our services or billing process, please contact our office at (practice phone number). If we are unable to resolve the concern, you have the right to direct comments to the office of Consumer Protection, in the [address] or the [facility] office, without fear of repercussion. DMEPOS SUPPLIER STANDARDS    A supplier must be in compliance with all applicable Federal and [facility] and regulatory requirements. A supplier must provide complete and accurate information on the DMEPOS supplier application. Any changes to this information must be reported to the [facility] within 30 days. An authorized individual (one whose signature is binding) must sign the application for billing privileges. A supplier must fill orders from its own inventory, or must contract with other companies for the purchase of items necessary to fill the order. A supplier may not contract with any entity that is currently excluded from the Medicare program, any [facility] program, or from any other Federal procurement or Nonprocurement programs. A supplier must advise beneficiaries that they may rent or purchase inexpensive or routinely purchased durable medical equipment, and of the purchase option for capped rental equipment. A supplier must notify beneficiaries of warranty coverage and honor all warranties under applicable State Law, and repair or replace free of charge Medicare covered items that are under warranty. A supplier must maintain a physical facility on an appropriate site. A supplier must permit CMS, or its agents to conduct on-site inspections to ascertain the supplier's compliance with these standards. The supplier location must be accessible to beneficiaries during reasonable business hours, and must maintain a visible sign and posted hours of operation.   A supplier must maintain a primary business telephone listed under the name of the business in a [employer] or a toll free number available through [employer] assistance. The exclusive use of a beeper, answering machine or cell phone is prohibited. A supplier must have comprehensive liability insurance in the amount of at least $300,000 that covers both the supplier's place of business and all customers and employees of the supplier. If the supplier manufactures its own items, this insurance must also cover product liability and completed operations. A supplier must agree not to initiate telephone contact with beneficiaries, with a few exceptions allowed. This standard prohibits suppliers from calling beneficiaries in order to solicit new business. A supplier is responsible for delivery and must instruct beneficiaries on use of Medicare covered items, and maintain proof of delivery. A supplier must answer questions, and respond to complaints of the beneficiaries, and maintain documentation of such contacts. A supplier must maintain and replace at no charge or repair directly, or through a service contract with another company, Medicare covered items it has rented to beneficiaries. A supplier must accept returns of substandard (less than full quality for the particular item) or unsuitable items (inappropriate for the beneficiary at the time it was fitted and rented or sold) from beneficiaries. A supplier must disclose these supplier standards to each beneficiary to whom it supplies a Medicare-covered item. A supplier must disclose to the government any person having ownership, financial, or control interest in the supplier. A supplier must not convey or reassign a supplier number; i.e., the supplier may not sell or allow another entity to use its Medicare billing number. A supplier must have a complaint resolution protocol established to address beneficiary complaints that relate to these standards. A record of these complaints must be maintained at the physical facility.   Complaint records must include: the name, address, telephone number and health insurance claim number of the beneficiary, a summary of the complaint, and any action taken to resolve it. A supplier must agree to furnish CMS any information required by the Medicare statute and implementing regulations. A supplier of DMEPOS and other items and services must be accredited by a CMS-approved accreditation organization in order to receive and retain a supplier billing number. The accreditation must indicate the specific products and services, for which the supplier is accredited in order for the supplier to receive payment for those specific products and services. A DMEPOS supplier must notify their accreditation organization when a new DMEPOS location is opened. The accreditation organization may accredit the new supplier location for three months after it is operational without requiring a new site visit. All DMEPOS supplier locations, whether owned or subcontracted, must meet the [employer] and be separately accredited in order to bill Medicare. An accredited supplier may be denied enrollment or their enrollment may be revoked, if CMS determines that they are not in compliance with the DMEPOS quality standards. A DMEPOS supplier must disclose upon enrollment all products and services, including the addition of new product lines for which they are seeking accreditation. If a new product line is added after enrollment, the DMEPOS supplier will be responsible for notifying the accrediting body of the new product so that the DMEPOS supplier can be re-surveyed and accredited for these new products. Must meet the surety bond requirements specified in 42 C. F.R. 424.57(c). Implementation date- May 4, 2009. A supplier must obtain oxygen from a state-licensed oxygen supplier. A supplier must maintain ordering and referring documentation consistent with provisions found in 42 C. F.R. 424.516(f).   DMEPOS suppliers are prohibited from sharing a practice location with certain other Medicare providers and suppliers. DMEPOS suppliers must remain open to the public for a minimum of 30 hours per week with certain exceptions.

## 2022-11-14 NOTE — H&P (VIEW-ONLY)
Name: Yadiel Gonzales  YOB: 1980  Gender: female  MRN: [id number]      CC: Follow-up (L knee pre-op)       HPI: Yadiel Gonzales is a 43 y.o. female who returns for a follow up preoperative appointment on left knee pain. She is scheduled for a Left Knee Arthroscopy Meniscal Repair with Dr. Veronica Chris on 11/16/2022. She reports continued left knee pain and would like to proceed with surgery. Current Outpatient Medications:     oxyCODONE (ROXICODONE) 5 MG immediate release tablet, Take 1 tablet by mouth every 4 hours as needed for Pain for up to 5 days. Intended supply: 5 days. Take lowest dose possible to manage pain, Disp: 30 tablet, Rfl: 0    OZEMPIC, 1 MG/DOSE, 4 MG/3ML SOPN, INJECT 1 MG UNDER THE SKIN EVERY 7 DAYS, Disp: 3 mL, Rfl: 3    Erenumab-aooe (AIMOVIG) 70 MG/ML SOAJ, Inject 70 mg into the skin every 30 days, Disp: 1 Adjustable Dose Pre-filled Pen Syringe, Rfl: 0    Erenumab-aooe (AIMOVIG) 70 MG/ML SOAJ, Inject 70 mg into the skin every 30 days, Disp: 3 Adjustable Dose Pre-filled Pen Syringe, Rfl: 1    ondansetron (ZOFRAN-ODT) 8 MG TBDP disintegrating tablet, TAKE 1 TABLET BY MOUTH EVERY 8 HOURS AS NEEDED FOR NAUSEA AND VOMITING, Disp: , Rfl:     FLOWFLEX COVID-19 AG HOME TEST KIT, , Disp: , Rfl:     Accu-Chek Softclix Lancets MISC, USE TO CHECK BLOOD SUGAR DAILY, Disp: , Rfl:     diclofenac (VOLTAREN) 75 MG EC tablet, Take 1 tablet by mouth 2 times daily, Disp: 60 tablet, Rfl: 0    rizatriptan (MAXALT-MLT) 10 MG disintegrating tablet, Take 1 tablet by mouth once as needed for Migraine May repeat in 2 hours if needed, Disp: 10 tablet, Rfl: 3    Semaglutide, 2 MG/DOSE, 8 MG/3ML SOPN, Inject 2 mg into the skin once a week, Disp: 9 mL, Rfl: 2    clorazepate (TRANXENE) 7.5 MG tablet, Take 1 tablet by mouth as needed for Anxiety for up to 90 days. , Disp: 90 tablet, Rfl: 1    esomeprazole (NEXIUM) 40 MG delayed release capsule, Take 1 capsule by mouth daily, Disp: 90 capsule, Rfl: 1 losartan-hydroCHLOROthiazide (HYZAAR) 100-25 MG per tablet, Take 1 tablet by mouth daily TAKE 1 TABLET DAILY, Disp: 90 tablet, Rfl: 1    metFORMIN (GLUCOPHAGE) 500 MG tablet, Take 1 tablet by mouth daily (with breakfast), Disp: 180 tablet, Rfl: 1    venlafaxine (EFFEXOR XR) 150 MG extended release capsule, Take 1 capsule by mouth 2 times daily, Disp: 90 capsule, Rfl: 1    cetirizine (ZYRTEC) 10 MG tablet, Take by mouth, Disp: , Rfl:     vitamin D 25 MCG (1000 UT) CAPS, Take by mouth daily, Disp: , Rfl:   Allergies   Allergen Reactions    Leuprolide Anaphylaxis and Swelling     Difficulty breathing.     Hydromorphone Itching    Sulfamethoxazole-Trimethoprim Other (See Comments)     Past Medical History:   Diagnosis Date    Acid reflux     Acute bacterial sinusitis     Allergic rhinitis due to pollen     Anemia     hx of    Anxiety state, unspecified     Asthma     inhaler PRN    Chronic sinusitis     Cough     Deviated nasal septum     Endometriosis     Essential hypertension, benign     no medications at this time    Fibrocystic breast     GERD (gastroesophageal reflux disease)     managed with medication    Headache     High blood pressure     Hypertrophy of nasal turbinates     Nasal obstruction     Nausea & vomiting     Other ill-defined conditions(799.89)     current sinus infection of 10 week duration    Ovarian cyst     PCOS (polycystic ovarian syndrome)     Pelvic inflammation in female     Pneumonia 2002    RLL, hospitalized    Preeclampsia     Sinus pain     Thyroid disease     reports small nodule - no medication or surgery    Unspecified adverse effect of anesthesia     difficulty voiding after having anesthesia    URI, acute     UTI (urinary tract infection)      Past Surgical History:   Procedure Laterality Date    CHOLECYSTECTOMY      COLONOSCOPY      normal no polyps, Dr Tatyana Ratliff, [address] (CERVIX STATUS UNKNOWN)      PELVIC LAPAROSCOPY      SEPTOPLASTY  1999?    septoplasty w/ bone spur removal, turbinates shaved; Dr. Jerome Muller @ [facility]     Family History   Problem Relation Age of Onset    Colon Cancer Mother     Cancer Mother         pancreatic cancer    High Cholesterol Mother     Breast Cancer Other 28        mat cousin    Lung Disease Neg Hx     Breast Cancer Maternal Aunt     Heart Disease Father      Social History     Socioeconomic History    Marital status:      Spouse name: Not on file    Number of children: Not on file    Years of education: Not on file    Highest education level: Not on file   Occupational History    Not on file   Tobacco Use    Smoking status: Never    Smokeless tobacco: Never   Substance and Sexual Activity    Alcohol use: No    Drug use: No    Sexual activity: Not on file   Other Topics Concern    Not on file   Social History Narrative     and lives with her . Two children. She does not smoke. No ETOH use. Works as [employer] at [employer] Strain: Not on [employer] Insecurity: Not on file   Transportation Needs: Not on file   Physical Activity: Not on file   Stress: Not on file   Social Connections: Not on file   Intimate Partner Violence: Not on file   Housing Stability: Not on file         Physical Examination:  General: no acute distress  Lungs: breathing easily  HEENT: NCAT  Abdomen: soft  CV: regular rhythm by pulse  Left Knee: Minimal effusion present. Tenderness to palpation medial joint line. Full active and passive range of motion with pain in the extremes. Negative ligamentous exam x4. Positive Talia's, bounce home test.        Assessment:   1. Tear of medial meniscus of left knee, current, unspecified tear type, subsequent encounter    2. Left knee pain, unspecified chronicity         Plan:   Surgical plan will be for medial meniscus repair versus debridement.   Discussed with the patient the rehab and recovery course depending upon whether she has any debridement or repair. I did discuss with the patient the importance of bringing all equipment with her to the day of surgery for placement in the OR. I provided her with a postoperative pain prescription and encouraged her to pick it up prior to surgery to prevent expiration and not to take the medication until after surgery. We discussed the risks of the procedure including but not limited to infection, bleeding, anesthetic complications postoperative DVT/PE. All of his questions have been answered and they elect to proceed as planned.       Sloan Pizano APRN - CNP    Orthopaedics and Sports Medicine

## 2022-11-15 ENCOUNTER — ANESTHESIA EVENT (OUTPATIENT)
Dept: SURGERY | Age: 42
End: 2022-11-15
Payer: COMMERCIAL

## 2022-11-15 NOTE — PROGRESS NOTES
The lab results below are within anesthesia guidelines, no further action required.     Latest Reference Range & Units 11/14/22 16:00   Potassium 3.5 - 5.1 mmol/L 3.4 (L)   Hemoglobin Quant 11.7 - 15.4 g/dL 11.2 (L)   (L): Data is abnormally low

## 2022-11-16 ENCOUNTER — HOSPITAL ENCOUNTER (OUTPATIENT)
Age: 42
Setting detail: OUTPATIENT SURGERY
Discharge: HOME | End: 2022-11-16
Attending: ORTHOPAEDIC SURGERY | Admitting: ORTHOPAEDIC SURGERY
Payer: COMMERCIAL

## 2022-11-16 ENCOUNTER — ANESTHESIA (OUTPATIENT)
Dept: SURGERY | Age: 42
End: 2022-11-16
Payer: COMMERCIAL

## 2022-11-16 VITALS
HEIGHT: 63 IN | RESPIRATION RATE: 14 BRPM | DIASTOLIC BLOOD PRESSURE: 66 MMHG | OXYGEN SATURATION: 94 % | SYSTOLIC BLOOD PRESSURE: 140 MMHG | WEIGHT: 250 LBS | HEART RATE: 98 BPM | TEMPERATURE: 97.4 F | BODY MASS INDEX: 44.3 KG/M2

## 2022-11-16 LAB
GLUCOSE BLD STRIP.AUTO-MCNC: 137 MG/DL (ref 65–100)
SERVICE CMNT-IMP: ABNORMAL

## 2022-11-16 PROCEDURE — 6360000002 HC RX W HCPCS: Performed by: SPECIALIST/TECHNOLOGIST

## 2022-11-16 PROCEDURE — 7100000010 HC PHASE II RECOVERY - FIRST 15 MIN: Performed by: ORTHOPAEDIC SURGERY

## 2022-11-16 PROCEDURE — 6360000002 HC RX W HCPCS: Performed by: ANESTHESIOLOGY

## 2022-11-16 PROCEDURE — 6370000000 HC RX 637 (ALT 250 FOR IP): Performed by: ANESTHESIOLOGY

## 2022-11-16 PROCEDURE — 2720000010 HC SURG SUPPLY STERILE: Performed by: ORTHOPAEDIC SURGERY

## 2022-11-16 PROCEDURE — 6360000002 HC RX W HCPCS: Performed by: ORTHOPAEDIC SURGERY

## 2022-11-16 PROCEDURE — 2580000003 HC RX 258: Performed by: ANESTHESIOLOGY

## 2022-11-16 PROCEDURE — 7100000011 HC PHASE II RECOVERY - ADDTL 15 MIN: Performed by: ORTHOPAEDIC SURGERY

## 2022-11-16 PROCEDURE — 7100000001 HC PACU RECOVERY - ADDTL 15 MIN: Performed by: ORTHOPAEDIC SURGERY

## 2022-11-16 PROCEDURE — 3600000004 HC SURGERY LEVEL 4 BASE: Performed by: ORTHOPAEDIC SURGERY

## 2022-11-16 PROCEDURE — 2500000003 HC RX 250 WO HCPCS: Performed by: NURSE ANESTHETIST, CERTIFIED REGISTERED

## 2022-11-16 PROCEDURE — 3700000001 HC ADD 15 MINUTES (ANESTHESIA): Performed by: ORTHOPAEDIC SURGERY

## 2022-11-16 PROCEDURE — 6360000002 HC RX W HCPCS: Performed by: NURSE ANESTHETIST, CERTIFIED REGISTERED

## 2022-11-16 PROCEDURE — 3700000000 HC ANESTHESIA ATTENDED CARE: Performed by: ORTHOPAEDIC SURGERY

## 2022-11-16 PROCEDURE — 82962 GLUCOSE BLOOD TEST: CPT

## 2022-11-16 PROCEDURE — 7100000000 HC PACU RECOVERY - FIRST 15 MIN: Performed by: ORTHOPAEDIC SURGERY

## 2022-11-16 PROCEDURE — 29881 ARTHRS KNE SRG MNISECTMY M/L: CPT | Performed by: ORTHOPAEDIC SURGERY

## 2022-11-16 PROCEDURE — 2709999900 HC NON-CHARGEABLE SUPPLY: Performed by: ORTHOPAEDIC SURGERY

## 2022-11-16 PROCEDURE — 3600000014 HC SURGERY LEVEL 4 ADDTL 15MIN: Performed by: ORTHOPAEDIC SURGERY

## 2022-11-16 RX ORDER — SODIUM CHLORIDE, SODIUM LACTATE, POTASSIUM CHLORIDE, CALCIUM CHLORIDE 600; 310; 30; 20 MG/100ML; MG/100ML; MG/100ML; MG/100ML
INJECTION, SOLUTION INTRAVENOUS CONTINUOUS
Status: DISCONTINUED | OUTPATIENT
Start: 2022-11-16 | End: 2022-11-16 | Stop reason: HOSPADM

## 2022-11-16 RX ORDER — FENTANYL CITRATE 50 UG/ML
INJECTION, SOLUTION INTRAMUSCULAR; INTRAVENOUS PRN
Status: DISCONTINUED | OUTPATIENT
Start: 2022-11-16 | End: 2022-11-16 | Stop reason: SDUPTHER

## 2022-11-16 RX ORDER — SODIUM CHLORIDE 0.9 % (FLUSH) 0.9 %
5-40 SYRINGE (ML) INJECTION EVERY 12 HOURS SCHEDULED
Status: DISCONTINUED | OUTPATIENT
Start: 2022-11-16 | End: 2022-11-16 | Stop reason: HOSPADM

## 2022-11-16 RX ORDER — APREPITANT 40 MG/1
40 CAPSULE ORAL
Status: COMPLETED | OUTPATIENT
Start: 2022-11-16 | End: 2022-11-16

## 2022-11-16 RX ORDER — PROPOFOL 10 MG/ML
INJECTION, EMULSION INTRAVENOUS PRN
Status: DISCONTINUED | OUTPATIENT
Start: 2022-11-16 | End: 2022-11-16 | Stop reason: SDUPTHER

## 2022-11-16 RX ORDER — FENTANYL CITRATE 50 UG/ML
100 INJECTION, SOLUTION INTRAMUSCULAR; INTRAVENOUS
Status: DISCONTINUED | OUTPATIENT
Start: 2022-11-16 | End: 2022-11-16 | Stop reason: HOSPADM

## 2022-11-16 RX ORDER — ACETAMINOPHEN 500 MG
1000 TABLET ORAL ONCE
Status: COMPLETED | OUTPATIENT
Start: 2022-11-16 | End: 2022-11-16

## 2022-11-16 RX ORDER — LIDOCAINE HYDROCHLORIDE 10 MG/ML
1 INJECTION, SOLUTION INFILTRATION; PERINEURAL
Status: DISCONTINUED | OUTPATIENT
Start: 2022-11-16 | End: 2022-11-16 | Stop reason: HOSPADM

## 2022-11-16 RX ORDER — MIDAZOLAM HYDROCHLORIDE 2 MG/2ML
2 INJECTION, SOLUTION INTRAMUSCULAR; INTRAVENOUS
Status: DISCONTINUED | OUTPATIENT
Start: 2022-11-16 | End: 2022-11-16 | Stop reason: HOSPADM

## 2022-11-16 RX ORDER — PROCHLORPERAZINE EDISYLATE 5 MG/ML
5 INJECTION INTRAMUSCULAR; INTRAVENOUS
Status: DISCONTINUED | OUTPATIENT
Start: 2022-11-16 | End: 2022-11-16 | Stop reason: HOSPADM

## 2022-11-16 RX ORDER — LIDOCAINE HYDROCHLORIDE 20 MG/ML
INJECTION, SOLUTION EPIDURAL; INFILTRATION; INTRACAUDAL; PERINEURAL PRN
Status: DISCONTINUED | OUTPATIENT
Start: 2022-11-16 | End: 2022-11-16 | Stop reason: SDUPTHER

## 2022-11-16 RX ORDER — SODIUM CHLORIDE 0.9 % (FLUSH) 0.9 %
5-40 SYRINGE (ML) INJECTION PRN
Status: DISCONTINUED | OUTPATIENT
Start: 2022-11-16 | End: 2022-11-16 | Stop reason: HOSPADM

## 2022-11-16 RX ORDER — LABETALOL HYDROCHLORIDE 5 MG/ML
10 INJECTION, SOLUTION INTRAVENOUS
Status: DISCONTINUED | OUTPATIENT
Start: 2022-11-16 | End: 2022-11-16 | Stop reason: HOSPADM

## 2022-11-16 RX ORDER — SODIUM CHLORIDE 9 MG/ML
INJECTION, SOLUTION INTRAVENOUS PRN
Status: DISCONTINUED | OUTPATIENT
Start: 2022-11-16 | End: 2022-11-16 | Stop reason: HOSPADM

## 2022-11-16 RX ORDER — HYDRALAZINE HYDROCHLORIDE 20 MG/ML
10 INJECTION INTRAMUSCULAR; INTRAVENOUS
Status: DISCONTINUED | OUTPATIENT
Start: 2022-11-16 | End: 2022-11-16 | Stop reason: HOSPADM

## 2022-11-16 RX ORDER — KETOROLAC TROMETHAMINE 30 MG/ML
INJECTION, SOLUTION INTRAMUSCULAR; INTRAVENOUS PRN
Status: DISCONTINUED | OUTPATIENT
Start: 2022-11-16 | End: 2022-11-16 | Stop reason: SDUPTHER

## 2022-11-16 RX ORDER — DEXAMETHASONE SODIUM PHOSPHATE 4 MG/ML
INJECTION, SOLUTION INTRA-ARTICULAR; INTRALESIONAL; INTRAMUSCULAR; INTRAVENOUS; SOFT TISSUE PRN
Status: DISCONTINUED | OUTPATIENT
Start: 2022-11-16 | End: 2022-11-16 | Stop reason: SDUPTHER

## 2022-11-16 RX ORDER — ONDANSETRON 2 MG/ML
INJECTION INTRAMUSCULAR; INTRAVENOUS PRN
Status: DISCONTINUED | OUTPATIENT
Start: 2022-11-16 | End: 2022-11-16 | Stop reason: SDUPTHER

## 2022-11-16 RX ORDER — ROPIVACAINE HYDROCHLORIDE 5 MG/ML
INJECTION, SOLUTION EPIDURAL; INFILTRATION; PERINEURAL PRN
Status: DISCONTINUED | OUTPATIENT
Start: 2022-11-16 | End: 2022-11-16 | Stop reason: HOSPADM

## 2022-11-16 RX ORDER — ONDANSETRON 2 MG/ML
4 INJECTION INTRAMUSCULAR; INTRAVENOUS
Status: DISCONTINUED | OUTPATIENT
Start: 2022-11-16 | End: 2022-11-16 | Stop reason: HOSPADM

## 2022-11-16 RX ORDER — FENTANYL CITRATE 50 UG/ML
50 INJECTION, SOLUTION INTRAMUSCULAR; INTRAVENOUS EVERY 5 MIN PRN
Status: DISCONTINUED | OUTPATIENT
Start: 2022-11-16 | End: 2022-11-16 | Stop reason: HOSPADM

## 2022-11-16 RX ORDER — FENTANYL CITRATE 50 UG/ML
25 INJECTION, SOLUTION INTRAMUSCULAR; INTRAVENOUS EVERY 5 MIN PRN
Status: DISCONTINUED | OUTPATIENT
Start: 2022-11-16 | End: 2022-11-16 | Stop reason: HOSPADM

## 2022-11-16 RX ORDER — OXYCODONE HYDROCHLORIDE 5 MG/1
5 TABLET ORAL
Status: COMPLETED | OUTPATIENT
Start: 2022-11-16 | End: 2022-11-16

## 2022-11-16 RX ADMIN — FENTANYL CITRATE 50 MCG: 50 INJECTION, SOLUTION INTRAMUSCULAR; INTRAVENOUS at 07:31

## 2022-11-16 RX ADMIN — FENTANYL CITRATE 50 MCG: 50 INJECTION, SOLUTION INTRAMUSCULAR; INTRAVENOUS at 07:52

## 2022-11-16 RX ADMIN — FENTANYL CITRATE 25 MCG: 50 INJECTION, SOLUTION INTRAMUSCULAR; INTRAVENOUS at 09:15

## 2022-11-16 RX ADMIN — DEXAMETHASONE SODIUM PHOSPHATE 4 MG: 4 INJECTION, SOLUTION INTRAMUSCULAR; INTRAVENOUS at 07:43

## 2022-11-16 RX ADMIN — SODIUM CHLORIDE, POTASSIUM CHLORIDE, SODIUM LACTATE AND CALCIUM CHLORIDE: 600; 310; 30; 20 INJECTION, SOLUTION INTRAVENOUS at 05:53

## 2022-11-16 RX ADMIN — FENTANYL CITRATE 50 MCG: 50 INJECTION, SOLUTION INTRAMUSCULAR; INTRAVENOUS at 07:17

## 2022-11-16 RX ADMIN — ONDANSETRON 4 MG: 2 INJECTION INTRAMUSCULAR; INTRAVENOUS at 07:59

## 2022-11-16 RX ADMIN — LIDOCAINE HYDROCHLORIDE 70 MG: 20 INJECTION, SOLUTION EPIDURAL; INFILTRATION; INTRACAUDAL; PERINEURAL at 07:17

## 2022-11-16 RX ADMIN — Medication 2000 MG: at 07:22

## 2022-11-16 RX ADMIN — ACETAMINOPHEN 1000 MG: 500 TABLET ORAL at 05:52

## 2022-11-16 RX ADMIN — FENTANYL CITRATE 50 MCG: 50 INJECTION, SOLUTION INTRAMUSCULAR; INTRAVENOUS at 07:48

## 2022-11-16 RX ADMIN — FENTANYL CITRATE 25 MCG: 50 INJECTION, SOLUTION INTRAMUSCULAR; INTRAVENOUS at 08:42

## 2022-11-16 RX ADMIN — Medication 1000 MG: at 07:43

## 2022-11-16 RX ADMIN — KETOROLAC TROMETHAMINE 30 MG: 30 INJECTION, SOLUTION INTRAMUSCULAR; INTRAVENOUS at 07:59

## 2022-11-16 RX ADMIN — APREPITANT 40 MG: 40 CAPSULE ORAL at 07:08

## 2022-11-16 RX ADMIN — FENTANYL CITRATE 25 MCG: 50 INJECTION, SOLUTION INTRAMUSCULAR; INTRAVENOUS at 09:07

## 2022-11-16 RX ADMIN — FENTANYL CITRATE 25 MCG: 50 INJECTION, SOLUTION INTRAMUSCULAR; INTRAVENOUS at 09:00

## 2022-11-16 RX ADMIN — PROPOFOL 200 MG: 10 INJECTION, EMULSION INTRAVENOUS at 07:17

## 2022-11-16 RX ADMIN — OXYCODONE 5 MG: 5 TABLET ORAL at 09:12

## 2022-11-16 ASSESSMENT — PAIN SCALES - GENERAL
PAINLEVEL_OUTOF10: 8
PAINLEVEL_OUTOF10: 8
PAINLEVEL_OUTOF10: 6
PAINLEVEL_OUTOF10: 8

## 2022-11-16 NOTE — ANESTHESIA POSTPROCEDURE EVALUATION
Department of Anesthesiology  Postprocedure Note    Patient: Montse Cardenas  MRN: [id number]  [place]: 1980  Date of evaluation: 11/16/2022      Procedure Summary     Date: 11/16/22 Room / Location: D OP OR 06 / [facility]    Anesthesia Start: 0708 Anesthesia Stop: [facility] [place]    Procedure: LEFT KNEE ARTHROSCOPY MEDIAL MENISCECTOMY (Left: Knee) Diagnosis:       Acute medial meniscus tear of left knee, initial encounter      Left knee pain, unspecified chronicity      (Acute medial meniscus tear of left knee, initial encounter [S83.242A])      (Left knee pain, unspecified chronicity [M25.562])    Surgeons: Patrica Baez MD Responsible Provider: Ara Tsai DO    Anesthesia Type: general ASA Status: 3          Anesthesia Type: No value filed.     Nash Phase I: Nash Score: 7    Nash Phase II: Nash Score: 10      Anesthesia Post Evaluation    Patient location during evaluation: PACU  Level of consciousness: awake and alert  Airway patency: patent  Nausea & Vomiting: no nausea  Complications: no  Cardiovascular status: hemodynamically stable  Respiratory status: acceptable  Hydration status: euvolemic

## 2022-11-16 NOTE — OP NOTE
Operative Report    Patient: Olaf Hunter MRN: [id number]  SSN: [id number]    YOB: 1980  Age: 43 y.o. Sex: female       Date of Surgery: 11/16/2022     Preoperative Diagnosis: left  Knee medial Meniscus tear    Postoperative Diagnosis: Same    Surgeon(s) and Role:     * Kay Amador MD - Primary    Anesthesia: General     Procedure:    1) left  Knee arthroscopy with partial medial meniscectomy/debridement      Estimated Blood Loss:  20 mL    Tourniquet Time:   Total Tourniquet Time Documented:  Thigh (Left) - 18 minutes  Total: Thigh (Left) - 18 minutes          Implants:   None           Specimens: * No specimens in log *        Drains: None                Complications: None    Counts: Sponge and needle counts were correct times two. Indications: See H&P      Findings:  Radial tear of the red-white zone of the posterior horn of the medial meniscus. Root attachment was intact as well as some peripheral fibers. 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 Patient was brought to the operating room placed supine the operating table general anesthesia was induced. The operative extremity was prepped and draped in usual orthopedic sterile fashion timeout was performed per protocol. Antibiotics were given per protocol. Initially a standard inferolateral arthroscopy portal was established. Diagnostic arthroscopy was carried out. Suprapatellar pouch was benign. Patellofemoral compartment demonstrated moderate degenerative changes with diffuse chondromalacia grade 3 and 4 of the articular surfaces of the undersurface of the patella and minimal changes of the trochlea. Medial and lateral gutters were free of loose bodies.   Anteromedial portal was established under spinal needle guidance. Synovitis in anterior interval was debrided with a motorized shaver. There was extensive synovitis throughout the knee. RF device was used to obtain hemostasis. ACL was intact and stable to probing with good tension. PCL was intact. Lateral compartment demonstrated maintenance of the articular surface with minimal degenerative changes. The meniscus was intact and stable to probing. The medial compartment demonstrated some diffuse grade 2 and grade III chondromalacia throughout the medial femoral condyle weightbearing surface. The anterior horn mid body and meniscus were intact. Visualization of the posterior horn was very difficult due to a tight medial compartment. We also had a venous tourniquet the tourniquet was let down at this point RF device was used to obtain hemostasis and a cannula was placed medially. I really cannot access the posterior horn medial meniscus we had a fenestrate the MCL pie crusting this with a spinal needle for a percutaneous approach on the femoral side with a valgus force. This relaxed the MCL and allowed us to access the posterior horn medial meniscus. This revealed a radial tear that was about a centimeter from the root attachment. This was not all the way through. There were peripheral fibers intact. The root tissue itself was intact. I probed this extensively and felt it was stable peripherally so we could debride the radial tear. Combination of arthroscopic biter and motorized shaver were used to resect as little tissue as possible while addressing the radial tear relieving the peripheral fibers intact. I did not feel this necessitated a root repair. The meniscus was then probed and noted to be stable. We then used an RF device werewolf to perform a chondroplasty of the unstable portions of the medial femoral condyle as well as the undersurface of the patella.     Tourniquet was let down the knee was drained the portals were closed with buried Monocryl suture and Steri-Strips. Local anesthetic with 0.5% Naropin was injected into incision sites and a small amount intra-articularly for post op pain control. Sterile dressings were applied. She was placed into a hinged knee brace to protect her MCL postoperatively  Neetu tolerated the procedure well was awakened and transferred to the PACU in stable condition        Postoperative plan:  1) Discharge Home  2) DVT prophylaxis with aspirin 81 mg twice daily x 2 weeks  3) Rehab protocol: Routine knee arthroscopy protocol with range of motion as tolerated and gradual progression of weightbearing as tolerated.   Use the hinged knee brace for support she can have range of motion as tolerated in the brace and weightbearing as tolerated in extension in the brace    Signed By:  Mohan Silverio MD     November 16, 2022 [Negative] : Genitourinary

## 2022-11-16 NOTE — ANESTHESIA PROCEDURE NOTES
Airway  Date/Time: 11/16/2022 7:18 AM  Urgency: elective    Airway not difficult    General Information and Staff    Patient location during procedure: OR  Resident/CRNA: CRISTINA Albrecht - CRNA  Performed: resident/CRNA     Indications and Patient Condition  Indications for airway management: anesthesia  Spontaneous Ventilation: absent  Sedation level: deep  Preoxygenated: yes  Patient position: sniffing  MILS maintained throughout  Mask difficulty assessment: not attempted    Final Airway Details  Final airway type: supraglottic airway      Successful airway: oropharyngeal  Size 4     Number of attempts at approach: 1  Ventilation between attempts: bag mask  Number of other approaches attempted: 0    no

## 2022-11-16 NOTE — INTERVAL H&P NOTE
Update History & Physical    The Patient's History and Physical was reviewed   I discussed the surgery and patients medical condition with the patient. The chart was reviewed with the patient and I examined the patient. There was no change from previous note. The surgical site was confirmed by the patient and me. CV: RRR  RESP: CTAB    Plan:  The risk, benefits, expected outcome, and alternative to the recommended procedure have been discussed with the patient. Patient understands and elects to proceed with the procedure as planned.     Electronically signed by Tlaia Dailey MD on 11/16/22 7:03 AM

## 2022-11-16 NOTE — DISCHARGE INSTRUCTIONS
Post-op instructions for Knee Arthroscopy (Dr. Delia Irwin)    Day of Surgery:     DIET:   Begin with liquids and light foods (jell-o, soup, etc.). Progress to your normal diet if you are not nauseated. MEDICATION:   1. Pain- Norco (hydrocodone/acetaminophen) or Oxycodone. If you are taking oxycodone, you may also take two (2) Tylenol (acetaminophen) 500mg tabs every eight (8) hours. Do not take Tylenol (acetaminophen) if you are given Norco as this medicine already contains acetaminophen. Do not drink alcohol while taking pain medication. Slowly wean off the pain medication as the pain improves. 2. Aspirin 81 mg: Take one (1) tablet in the morning and at night each day x 2 weeks post-operatively. 3. Stool Softener-Pain medication can cause constipation. Be sure to drink plenty of water and take an over the counter stool softener as needed. ICE:  Do NOT put the ice machine directly on your skin. Use it frequently for the first 72 hours. You may also use a regular ice bag/pack for 20 minutes at a time. Place a thin layer of clothing or pillow case between ice pack and your skin. Ice for 20-30 minutes on and then 20-30 minutes off. If you have the Surgical dressing intact you may run your ice machine for as long as as comfortable as long as your skin is not irritated/burned. SHOWERING:  No showering. Leave bandages in place. ACTIVITY:  Keep leg elevated on a pillow placed under your ankle. **DO NOT PUT A PILLOW UNDER YOUR KNEE!!**  It is important for you to keep your leg straight. Use crutches and you may put light weight on the operative leg. Brace: you should wear the hinge knee brace at all times, it is ok to bear light weight on the leg with the brace locked in extension    EXERCISES:  Begin ankle pumps. Attempt quadriceps sets and straight leg raises    First & Second Post-Op Day:    MEDICATION: Continue as instructed above. BANDAGES: No showering. Keep bandage in place.      EXERCISES: Continue Quad sets and ankle pumps as above. Bend and extend the knee as tolerated. You may put light weight on the leg. Continue to use the crutches. Place a pillow under the ankle and NOT under the knee when icing / elevating the leg. ICE: Continue to use the ice machine frequently as instructed above. Place a thin layer of clothing or pillow case between ice pack and your skin. Ice for 20-30 minutes on and then 20-30 minutes off. Third Post-Op Day:    MEDICATION:  Continue as instructed above. BANDAGES:   (after 72 hours/3 days): Remove outer bandages. Leave steri-strips in place. You may shower, but keep the incisions as dry as possible (cover with plastic wrap). It is best to sit down in the shower to avoid slipping. EXERCISES:    Continue exercises as noted above. Begin to gradually increase weight bearing. Continue with light, partial weight bearing while using crutches. If you have no pain and are able to walk without limping, you may increase weight bearing as tolerated and progress off crutches over the next couple of days. ICE:   Continue to ice frequently eight with the ice machine or regular ice pack. Do not put it directly on your skin. Place a thin layer of clothing or pillow case between ice pack and your skin. Ice for 20-30 minutes on and then 20-30 minutes off. PHYSICAL THERAPY APPOINTMENT:  If you do not already have a PT appointment scheduled, please make an appointment for 3-7 days post-op. GENERAL INFORMATION:     KNEE RESPONSE TO SURGERY:  -Your knee and lower leg will be swollen.  -It may take 4 weeks or longer for the swelling to go away. -It is also common to notice bruising around the thigh, knee and calf. Call with any problems or questions. [phone] if you have any questions or problems.  Please contact us immediately if you notice fever greater than 101 degrees F, excessive bleeding, or drainage from the surgery site, calf pain, or shortness of breath. If you are calling after hours or on a weekend, you may receive a call back from the \"on call\" physician       ACTIVITY  As tolerated and as directed by your doctor. Bathe or shower as directed by your doctor. DIET  Clear liquids until no nausea or vomiting; then light diet for the first day. Advance to regular diet on second day, unless your doctor orders otherwise. If nausea and vomiting continues, call your doctor. MEDICATION INTERACTION:During your procedure you potentially received a medication or medications which may reduce the effectiveness of oral contraceptives. Please consider other forms of contraception for 1 month following your procedure if you are currently using oral contraceptives as your primary form of birth control. In addition to this, we recommend continuing your oral contraceptive as prescribed, unless otherwise instructed by your physician, during this time        After general anesthesia or intravenous sedation, for 24 hours or while taking prescription Narcotics:  Limit your activities  A responsible adult needs to be with you for the next 24 hours  Do not drive and operate hazardous machinery  Do not make important personal or business decisions  Do not drink alcoholic beverages  If you have not urinated within 8 hours after discharge, and you are experiencing discomfort from urinary retention, please go to the nearest ED. If you have sleep apnea and have a CPAP machine, please use it for all naps and sleeping. Please use caution when taking narcotics and any of your home medications that may cause drowsiness. *  Please give a list of your current medications to your Primary Care Provider. *  Please update this list whenever your medications are discontinued, doses are      changed, or new medications (including over-the-counter products) are added. *  Please carry medication information at all times in case of emergency situations.

## 2022-11-16 NOTE — ANESTHESIA PRE PROCEDURE
Department of Anesthesiology  Preprocedure Note       Name:  Rachelle Severin   Age:  43 y.o.  :  1980                                          MRN:  [id number]         Date:  2022      Surgeon: Aparna Dunn):  Gilberto Mercado MD    Procedure: Procedure(s):  LEFT KNEE ARTHROSCOPY MENISCAL REPAIR    SUPINE    Medications prior to admission:   Prior to Admission medications    Medication Sig Start Date End Date Taking? Authorizing Provider   acetaminophen (TYLENOL) 500 MG tablet Take 1,000 mg by mouth in the morning and at bedtime 11/15/22 11/15/22 Yes Historical Provider, MD   OZEMPIC, 1 MG/DOSE, 4 MG/3ML SOPN INJECT 1 MG UNDER THE SKIN EVERY 7 DAYS 10/28/22   William Shipley MD   oxyCODONE (ROXICODONE) 5 MG immediate release tablet Take 1 tablet by mouth every 4 hours as needed for Pain for up to 5 days. Intended supply: 5 days.  Take lowest dose possible to manage pain  Patient not taking: Reported on 2022  CRISTINA Mann CNP   Erenumab-aooe (AIMOVIG) 70 MG/ML SOAJ Inject 70 mg into the skin every 30 days 10/4/22   William Shipley MD   Sourav Ramirez (AIMOVIG) 70 MG/ML SOAJ Inject 70 mg into the skin every 30 days 10/4/22   William Shipley MD   ondansetron (ZOFRAN-ODT) 8 MG TBDP disintegrating tablet Place 8 mg under the tongue every 8 hours as needed 22   Historical Provider, MD [address] TEST KIT  22   Historical Provider, MD   Accu-Chek Softclix Lancets MISC USE TO CHECK BLOOD SUGAR DAILY 22   Historical Provider, MD   diclofenac (VOLTAREN) 75 MG EC tablet Take 1 tablet by mouth 2 times daily  Patient taking differently: Take 75 mg by mouth 2 times daily Last dose 22 per patient- holding for surgery 22   CRISTINA Mann CNP   rizatriptan (MAXALT-MLT) 10 MG disintegrating tablet Take 1 tablet by mouth once as needed for Migraine May repeat in 2 hours if needed 22  William Shipley MD Semaglutide, 2 MG/DOSE, 8 MG/3ML SOPN Inject 2 mg into the skin once a week 8/17/22   Donna Mahan MD   esomeprazole (NEXIUM) 40 MG delayed release capsule Take 1 capsule by mouth daily 8/17/22   Donna Mahan MD   losartan-hydroCHLOROthiazide [place]) 100-25 MG per tablet Take 1 tablet by mouth daily TAKE 1 TABLET DAILY 8/17/22   Donna Mahan MD   metFORMIN (GLUCOPHAGE) 500 MG tablet Take 1 tablet by mouth daily (with breakfast) 8/17/22   Donna Mahan MD   venlafaxine (EFFEXOR XR) 150 MG extended release capsule Take 1 capsule by mouth 2 times daily 8/17/22   Donna Mahan MD   cetirizine (ZYRTEC) 10 MG tablet Take 20 mg by mouth daily    Ar Automatic Reconciliation   vitamin D 25 MCG (1000 UT) CAPS Take by mouth daily    Ar Automatic Reconciliation       Current medications:    Current Facility-Administered Medications   Medication Dose Route Frequency Provider Last Rate Last Admin    sodium chloride flush 0.9 % injection 5-40 mL  5-40 mL IntraVENous 2 times per day CRISTINA Szymanski CNP        sodium chloride flush 0.9 % injection 5-40 mL  5-40 mL IntraVENous PRN CRISTINA Szymanski CNP        0.9 % sodium chloride infusion   IntraVENous PRN CRISTINA Szymanski CNP        ceFAZolin (ANCEF) 2000 mg in sterile water 20 mL IV syringe  2,000 mg IntraVENous On Call to [facility], APRN - CNP        lidocaine 1 % injection 1 mL  1 mL IntraDERmal Once PRN Lou Pickens DO        fentaNYL (SUBLIMAZE) injection 100 mcg  100 mcg IntraVENous Once PRN Lou Pickens DO        midazolam PF (VERSED) injection 2 mg  2 mg IntraVENous Once PRN Lou Pickens DO        lactated ringers infusion   IntraVENous Continuous Lou Duong  mL/hr at 11/16/22 0553 New Bag at 11/16/22 0553       Allergies: Allergies   Allergen Reactions    Leuprolide Anaphylaxis and Swelling     Difficulty breathing.     Hydromorphone Itching       Problem List:    Patient Active Problem List   Diagnosis Code    Hyperglycemia R73.9    Deviated nasal septum J34.2    Nasal obstruction J34.89    Acute bacterial sinusitis J01.90, B96.89    Allergic rhinitis due to pollen J30.1    Cough R05.9    Acid reflux K21.9    Chronic sinusitis J32.9    Hypertrophy of nasal turbinates J34.3    Obesity, morbid (HCC) E66.01    Allergic rhinitis J30.9    Asthma J45.909    URI, acute J06.9    Essential hypertension, benign I10    DANNY (stress urinary incontinence, female) N39.3    Acute medial meniscus tear of left knee S83.242A    Left knee pain M25.562       Past Medical History:        Diagnosis Date    Acid reflux     Acute bacterial sinusitis     Allergic rhinitis due to pollen     Anemia     never required transfusion or iron infusions    Anxiety state, unspecified     Asthma     inhaler PRN/ no current Rx inhaler - last required ~2020    Chronic sinusitis     Cough     COVID-19 10/2021    denies hospitalization    Deviated nasal septum     Endometriosis     Essential hypertension, benign     controlled with meds    Fibrocystic breast     GERD (gastroesophageal reflux disease)     medication, well controlled    Headache     High blood pressure     Hypertrophy of nasal turbinates     Other ill-defined conditions(799.89)     current sinus infection of 10 week duration    Ovarian cyst     PCOS (polycystic ovarian syndrome)     Pelvic inflammation in female     Pneumonia 2002    RLL, hospitalized    PONV (postoperative nausea and vomiting)     Postoperative urinary retention     multiple times, has required murry for several days    Preeclampsia     Sinus pain     Thyroid disease     reports small nodule - no medication or surgery    URI, acute     UTI (urinary tract infection)        Past Surgical History:        Procedure Laterality Date    CARPAL TUNNEL RELEASE Bilateral     CHOLECYSTECTOMY      COLONOSCOPY      normal no polyps,  [place]    HEMORRHOID SURGERY      HYSTERECTOMY (CERVIX STATUS UNKNOWN)      PELVIC LAPAROSCOPY      SEPTOPLASTY  2015    septoplasty w/ bone spur removal, turbinates shaved; Dr. Mery Livingston @ [address] [place] [address]  2009    SMRITs, Balloon Sinuplasty       Social History:    Social History     Tobacco Use    Smoking status: Never    Smokeless tobacco: Never   Substance Use Topics    Alcohol use: No                                Counseling given: Not Answered      Vital Signs (Current):   Vitals:    11/14/22 1156 11/16/22 0537   BP:  (!) 190/93   Pulse:  76   Resp:  18   Temp:  98.6 °F (37 °C)   TempSrc:  Oral   SpO2:  99%   Weight: 250 lb (113.4 kg) 250 lb (113.4 kg)   Height: 5' 3\" (1.6 m)                                               BP Readings from Last 3 Encounters:   11/16/22 (!) 190/93   08/17/22 132/78   03/30/22 136/80       NPO Status:                                                                                 BMI:   Wt Readings from Last 3 Encounters:   11/16/22 250 lb (113.4 kg)   08/17/22 248 lb (112.5 kg)   03/30/22 254 lb (115.2 kg)     Body mass index is 44.29 kg/m².     CBC:   Lab Results   Component Value Date/Time    WBC 9.7 03/30/2022 03:03 PM    RBC 4.00 03/30/2022 03:03 PM    HGB 11.2 11/14/2022 04:00 PM    HCT 34.4 03/30/2022 03:03 PM    MCV 86 03/30/2022 03:03 PM    RDW 13.7 03/30/2022 03:03 PM     03/30/2022 03:03 PM       CMP:   Lab Results   Component Value Date/Time     03/30/2022 03:03 PM    K 3.4 11/14/2022 04:00 PM    CL 98 03/30/2022 03:03 PM    CO2 22 03/30/2022 03:03 PM    BUN 11 03/30/2022 03:03 PM    CREATININE 0.80 03/30/2022 03:03 PM    GFRAA 106 09/13/2021 10:14 AM    AGRATIO 1.5 03/30/2022 03:03 PM    GLUCOSE 111 03/30/2022 03:03 PM    PROT 7.4 03/30/2022 03:03 PM    CALCIUM 9.7 03/30/2022 03:03 PM    BILITOT 0.3 03/30/2022 03:03 PM    ALKPHOS 87 03/30/2022 03:03 PM    AST 15 03/30/2022 03:03 PM    ALT 28 03/30/2022 03:03 PM       POC Tests:   Recent Labs     11/16/22  0551   POCGLU 137*       Coags: No results found for: PROTIME, INR, APTT    HCG (If Applicable): No results found for: PREGTESTUR, PREGSERUM, HCG, HCGQUANT     ABGs: No results found for: PHART, PO2ART, DYF2UMQ, HNW1PRJ, BEART, Y3DJGGVL     Type & Screen (If Applicable):  No results found for: LABABO, LABRH    Drug/Infectious Status (If Applicable):  No results found for: HIV, HEPCAB    COVID-19 Screening (If Applicable): No results found for: COVID19        Anesthesia Evaluation  Patient summary reviewed and Nursing notes reviewed   history of anesthetic complications: PONV. Airway: Mallampati: II          Dental: normal exam         Pulmonary:normal exam  breath sounds clear to auscultation  (+) pneumonia: resolved,  asthma:                            Cardiovascular:  Exercise tolerance: good (>4 METS),   (+) hypertension:,         Rhythm: regular  Rate: normal                    Neuro/Psych:   (+) headaches:,             GI/Hepatic/Renal:   (+) GERD: well controlled, morbid obesity          Endo/Other:                     Abdominal:             Vascular: Other Findings:           Anesthesia Plan      general     ASA 3       Induction: intravenous. MIPS: Postoperative opioids intended and Prophylactic antiemetics administered. Anesthetic plan and risks discussed with patient and spouse.                         Fernando Owusu DO   11/16/2022

## 2022-11-18 ENCOUNTER — HOSPITAL ENCOUNTER (OUTPATIENT)
Dept: PHYSICAL THERAPY | Age: 42
Setting detail: RECURRING SERIES
Discharge: HOME | End: 2022-11-21
Payer: COMMERCIAL

## 2022-11-18 PROCEDURE — 97161 PT EVAL LOW COMPLEX 20 MIN: CPT

## 2022-11-18 PROCEDURE — 97110 THERAPEUTIC EXERCISES: CPT

## 2022-11-18 ASSESSMENT — PAIN SCALES - GENERAL: PAINLEVEL_OUTOF10: 6

## 2022-11-18 NOTE — PROGRESS NOTES
Yadiel Gonzales  : 1980  Primary: Veronica Barbour  Secondary:  [address] [facility] [address]  Phone: [phone]  Fax: [phone] Plan Frequency: 2 x a week  Plan of Care/Certification Expiration Date: 23    PT Visit Info:   1        OUTPATIENT PHYSICAL THERAPY:OP NOTE TYPE: Treatment Note 2022       Episode  }Appt Desk              ICD-10: Treatment Diagnosis: Pain in left knee (M25.562)  Stiffness of left knee, not elsewhere classified (M25.662)  Encounter for other specified surgical aftercare (Z48.89)   Medical/Referring Diagnosis:  Tear of medial meniscus of left knee, current, unspecified tear type, subsequent encounter [S83.242D]  Left knee pain, unspecified chronicity [M25.562]  Referring Physician:  CRISTINA Jain - *  MD Orders:  PT Eval and Treat   Date of Onset:  Onset Date: 22   Allergies:   Leuprolide and Hydromorphone  Restrictions/Precautions:  Restrictions/Precautions: Surgical protocol  No data recorded   Interventions Planned (Treatment may consist of any combination of the following):    Current Treatment Recommendations: Strengthening; ROM; Balance training; Functional mobility training; Transfer training; Endurance training; Gait training; Stair training; Neuromuscular re-education; Manual Therapy - Soft Tissue Mobilization; Return to work related activity; Home exercise program; Safety education & training; Modalities; Therapeutic activities; Wound care     Subjective Comments:   See Eval  Initial:}    6/10Post Session:       4/10  Medications Last Reviewed:  2022  Updated Objective Findings:  See evaluation note from today    Rehab protocol: Routine knee arthroscopy protocol with range of motion as tolerated and gradual progression of weightbearing as tolerated.   Use the hinged knee brace for support she can have range of motion as tolerated in the brace and weightbearing as tolerated in extension in the brace      Treatment       THERAPEUTIC EXERCISE: (23 minutes):  Exercises per grid below to improve mobility, strength and balance. Required minimal visual, verbal and manual cues to promote proper body alignment and promote proper body posture. Progressed resistance and complexity of movement as indicated. Date:  11/18/2022 Date:   Date:     Activity/Exercise Parameters Parameters Parameters   Education HEP, POC, PT goals, anatomy/pathology     ROM 6min     Bike      Nu step      L stretch      Calfstretch [id number]     HSS [id number]     Quad set 20 x 5sec     Heel slide [id number]     Heel prop 4min                             THERAPEUTIC ACTIVITY: ( 0 minutes): Activities per gid below to improve functional movement related mobility, strength and balance to improve neuro-muscular carryover to daily functional activities for improving patient's quality of life. Required visual, verbal and manual cues to promote proper body alignment and promote proper body posture/mechanics. Progressed resistance and complexity of movement as indicated. Date:  11/18/2022 Date:   Date:     Activity/Exercise Parameters Parameters Parameters                                                   MANUAL THERAPY: (0 minutes): Joint mobilization, Soft tissue mobilization was utilized and necessary because of the patient's restricted joint motion and restricted motion of soft tissue mobility. Date  11/18/2022    Technique Used Grade  Level # Time(s) Effect while being performed                                                                 HEP Log Date    11/18/2022   2.     3.    4.    5.              Treatment/Session Summary:    Treatment Assessment:   See Eval  Communication/Consultation:  Spoke with patient in regards to gait and activity progression. Equipment provided today:  None  Recommendations/Intent for next treatment session: Next visit will focus on progression of ROM and WB.     Total Treatment Billable Duration:  23 minutes   Time In: 0945  Time Out: [address] [facility] [place], PT       Charge Capture  }Post Session Pain  MedBridge Portal  MD Guidelines  Scanned Media  Benefits  MyChart    Future Appointments   Date Time Provider Hernan Surekha   11/22/2022  7:15 AM Buzzy Ambrocio, PT [facility]   11/28/2022  8:30 AM Damir Raines MD [facility]   11/29/2022  7:15 AM Buzzy Ambrocio, PT [facility]   12/1/2022  7:15 AM Buzzy Ambrocio, PT [facility]   12/6/2022  7:15 AM Buzzy Ambrocio, PT [facility]   12/8/2022  7:15 AM Buzzy Ambrocio, PT [facility]   12/13/2022  7:15 AM Buzzy Ambrocio, PT [facility]   12/15/2022  7:15 AM Buzzy Ambrocio, PT [facility]   12/20/2022  7:15 AM Buzzy Ambrocio, PT [facility]   12/22/2022  7:15 AM Buzzy Ambrocio, PT [facility]   1/3/2023  7:15 AM Buzzy Ambrocio, PT [facility]   1/5/2023  7:15 AM Buzzy Ambrocio, PT [facility]   1/10/2023  7:15 AM Buzzy Ambrocio, PT [facility]   1/12/2023  7:15 AM Buzzy Ambrocio, PT [facility]

## 2022-11-18 NOTE — THERAPY EVALUATION
Elsy Chance  : 1980  Primary: Braydon Barbour  Secondary:  [address] [facility] [address]  Phone: [phone]  Fax: [phone] Plan Frequency: 2 x a week  Plan of Care/Certification Expiration Date: 23    PT Visit Info:     Total # of Visits to Date: 1    OUTPATIENT PHYSICAL THERAPY:OP NOTE TYPE: Initial Assessment 2022               Episode  Appt Desk         ICD-10: Treatment Diagnosis: Pain in left knee (M25.562)  Stiffness of left knee, not elsewhere classified (C58.731)  Encounter for other specified surgical aftercare (Z48.89)   Medical/Referring Diagnosis:  Tear of medial meniscus of left knee, current, unspecified tear type, subsequent encounter [S83.242D]  Left knee pain, unspecified chronicity [R29.441]  Referring Physician:  CRISTINA Uribe - *  MD Orders:  PT Eval and Treat   Return MD Appt:  2022  Date of Onset:  Onset Date: 22   Allergies:  Leuprolide and Hydromorphone  Restrictions/Precautions:    Restrictions/Precautions: Surgical protocol     Medications Last Reviewed:  2022     SUBJECTIVE   History of Injury/Illness (Reason for Referral):  Patient reports progressive left knee pain and catching leading to current surgery  Patient Stated Goal(s):  \"return to normal activity\"  Initial:     6/10 Post Session:     4/10  Past Medical History/Comorbidities:   Ms. Emma Cruz  has a past medical history of Acid reflux, Acute bacterial sinusitis, Allergic rhinitis due to pollen, Anemia, Anxiety state, unspecified, Asthma, Chronic sinusitis, Cough, COVID-19, Deviated nasal septum, Endometriosis, Essential hypertension, benign, Fibrocystic breast, GERD (gastroesophageal reflux disease), Headache, High blood pressure, Hypertrophy of nasal turbinates, Other ill-defined conditions(799.89), Ovarian cyst, PCOS (polycystic ovarian syndrome), Pelvic inflammation in female, Pneumonia, PONV (postoperative nausea and vomiting), Postoperative urinary retention, Preeclampsia, Sinus pain, Thyroid disease, URI, acute, and UTI (urinary tract infection). Ms. Nilo Amos  has a past surgical history that includes Cholecystectomy; Colonoscopy; Septoplasty (2015); Hysterectomy; pelvic laparoscopy; Hemorrhoid surgery; sinus surgery (2009); Carpal tunnel release (Bilateral); and Knee arthroscopy (Left, 11/16/2022). Social History/Living Environment:   Lives With: Spouse  Type of Home: House  Home Layout: One level     Prior Level of Function/Work/Activity:   Prior level of function: Independent  Occupation: Full time employment     Learning:   Does the patient/guardian have any barriers to learning?: No barriers     Fall Risk Scale:    Abdalla Total Score: 20  Abdalla Fall Risk: Low (0-24)           OBJECTIVE     Observation/Orthostatic Postural Assessment:   Gait:  WBAT Bilateral Crutches  Genu Valgus/Varus Normal  Palpation:  Assessed @ Initial Visit: Tenderness to Anterior left knee    AROM/PROM         Joint: Eval Date: 11/18/2022  Re-Assess Date:  Re-Assess Date:    Active ROM RIGHT LEFT RIGHT LEFT RIGHT LEFT   Knee Extension Hyper 3 -5       Knee Flexion 130 45       Hip Flexion WNL WNL       Ankle mobility WNL WNL                                           Passive ROM         Knee Extension WNL 3       Knee Flexion WNL 50         Strength:     Eval Date: 11/18/2022  Re-Assess Date:  Re-Assess Date:      RIGHT LEFT RIGHT LEFT RIGHT LEFT   Knee Flexion 5/5 NT       Knee Extension 5/5 NT       Hip Flexion 5/5 NT       Hip Abduction 5/5 NT       Hip Extension 5/5 NT       Ankle Dorsiflexion   5/5 NT       Ankle Plantarflexion 5/5 NT         Special Tests:   Not tested post surgical      Manual:  Initial Evaluation           Joint Directon Grade Treatment Effect   Patella 4 way II III Improved mobility             Neurological Screen:  No radiating symptoms down leg    Functional Mobility:   Sit to Stand= With UE assist  Squat= unable  Single Leg Step Down= unable            Balance and Mobility:  Test Result   Timed up and Go NT   30 second Sit to Stand NT   Single Leg Balance Right: < 45  Left:  < 0         ASSESSMENT   Initial Assessment:    Devon Britt presents to physical therapy with decreased strength, ROM, joint mobility, functional mobility. These S/S are consistent with referring diagnosis. Patient will benefit from skilled physical therapy for manual therapeutic techniques (as appropriate), therapeutic exercises and activities, balance and comprehensive home exercises program to address current impairments and functional limitations. Problem List: (Impacting functional limitations): Body Structures, Functions, Activity Limitations Requiring Skilled Therapeutic Intervention: Decreased functional mobility ; Decreased ADL status; Decreased ROM; Decreased body mechanics; Decreased tolerance to work activity; Decreased strength; Decreased endurance; Decreased balance; Increased pain     Therapy Prognosis:   Therapy Prognosis: Excellent     Assessment Complexity:   Low Complexity  PLAN   Effective Dates: 11/18/2022 TO Plan of Care/Certification Expiration Date: 02/17/23   Frequency/Duration: Plan Frequency: 2 x a week   Interventions Planned (Treatment may consist of any combination of the following):    Current Treatment Recommendations: Strengthening; ROM; Balance training; Functional mobility training; Transfer training; Endurance training; Gait training; Stair training; Neuromuscular re-education; Manual Therapy - Soft Tissue Mobilization; Return to work related activity; Home exercise program; Safety education & training; Modalities; Therapeutic activities; Wound care       GOALS: (Goals have been discussed and agreed upon with patient.)   Short-Term Goals~4 weeks Goal Met   1. Devon Britt will be independent with HEP for strength and ROM 1. Date:   2.  Devon Britt will tolerate manual therapy/joint mobilizations to increase knee flexion ROM so pt can ambulate stairs and walk with a more normalized gait pattern. 2.   Date:   3. Praveen Rubio will participate in LE strengthening exercises for hip, knee, ankle with weight as appropriate for 3 sets of 10. 3.   Date:   4. Praveen Rubio will tolerate scar massage as appropriate to improve tissue mobility with patient to perform independently after education 4. Date:   5. Praveen Rubio will participate in static and dynamic balance activities for 5 minutes to help improve proprioception and decrease risk of falls 5. Date:   6. Praveen Rubio to increase lower extremity functional scale by 10 points to show improvement in areas of difficulty 6. Date:   7. Praveen Rubio will demonstrate left knee flexion >= 130 degrees to improve functional mobility and tolerance of ADLs. 7.   Date:   8. Praveen Rubio will demonstrate left knee extension >= o degrees to improve functional mobility and tolerance of ADLs 8. Date:   5. Praveen Rubio will improve MMT left LE to >=4+/5 to improve current level of independence and community reintegration. 9.   Date:         Long Term Goals~8 weeks Goal Met   1. Praveen Rubio will be independent in HEP of stretching and strengthening 1. Date:   2. Praveen Rubio will be able to perform sit to stand transfers independently with increased knee flexion and decreased use of upper extremities 2. Date:   3. Praveen Rubio will ascend/descend 12 steps with reciprocal gait pattern and rail 3. Date:   4. Praveen Rubio to increase lower extremity functional scale by 30 points to show improvement in areas of difficulty  4. Date:                      Outcome Measure:    Tool Used: Lower Extremity Functional Scale (LEFS)  Score:  Initial: 19/80 Most Recent: X/80 (Date: -- )   Interpretation of Score: 20 questions each scored on a 5 point scale with 0 representing \"extreme difficulty or unable to perform\" and 4 representing \"no difficulty\". The lower the score, the greater the functional disability. 80/80 represents no disability. Minimal detectable change is 9 points. Medical Necessity:   > Patient is expected to demonstrate progress in strength, range of motion, balance, coordination, and functional technique to return to normal activity. Reason For Services/Other Comments:  > Patient continues to require modification of therapeutic interventions to increase complexity of exercises. Total Duration:  Time In: 0945  Time Out: Humberto Everett's therapy, I certify that the treatment plan above will be carried out by a therapist or under their direction.   Thank you for this referral,  Jane Wolf, TACHO     Referring Physician Signature: CRISTINA Estrada - * _______________________________ Date _____________        Post Session Pain  Charge Capture   POC Link  Treatment Note Link  MD Guidelines  [facility]

## 2022-11-22 ENCOUNTER — HOSPITAL ENCOUNTER (OUTPATIENT)
Dept: PHYSICAL THERAPY | Age: 42
Setting detail: RECURRING SERIES
Discharge: HOME | End: 2022-11-25
Payer: COMMERCIAL

## 2022-11-22 PROCEDURE — 97110 THERAPEUTIC EXERCISES: CPT

## 2022-11-22 ASSESSMENT — PAIN SCALES - GENERAL: PAINLEVEL_OUTOF10: 4

## 2022-11-22 NOTE — PROGRESS NOTES
Shira Tomas  : 1980  Primary: Edgar Barbour  Secondary:  Art Trinh @ [facility] [address]  Phone: [phone]  Fax: [phone] Plan Frequency: 2 x a week  Plan of Care/Certification Expiration Date: 23      PT Visit Info:   2        OUTPATIENT PHYSICAL THERAPY:OP NOTE TYPE: Treatment Note 2022       Episode  }Appt Desk              ICD-10: Treatment Diagnosis: Pain in left knee (M25.562)  Stiffness of left knee, not elsewhere classified (M25.662)  Encounter for other specified surgical aftercare (Z48.89)   Medical/Referring Diagnosis:  Tear of medial meniscus of left knee, current, unspecified tear type, subsequent encounter [S83.242D]  Left knee pain, unspecified chronicity [M25.562]  Referring Physician:  CRISTINA Salgado - *  MD Orders:  PT Eval and Treat   Date of Onset:  Onset Date: 22     Allergies:   Leuprolide and Hydromorphone  Restrictions/Precautions:  Restrictions/Precautions: Surgical protocol  No data recorded   Interventions Planned (Treatment may consist of any combination of the following):    Current Treatment Recommendations: Strengthening; ROM; Balance training; Functional mobility training; Transfer training; Endurance training; Gait training; Stair training; Neuromuscular re-education; Manual Therapy - Soft Tissue Mobilization; Return to work related activity; Home exercise program; Safety education & training; Modalities; Therapeutic activities; Wound care     Subjective Comments:  Pt reports being out of the brace a lot but staying in the house  Initial:}    4/10Post Session:       2/10  Medications Last Reviewed:  2022  Updated Objective Findings:   ROM 3-95    Rehab protocol: Routine knee arthroscopy protocol with range of motion as tolerated and gradual progression of weightbearing as tolerated.   Use the hinged knee brace for support she can have range of motion as tolerated in the brace and weightbearing as tolerated in extension in the brace      Treatment       THERAPEUTIC EXERCISE: (39 minutes):  Exercises per grid below to improve mobility, strength and balance. Required minimal visual, verbal and manual cues to promote proper body alignment and promote proper body posture. Progressed resistance and complexity of movement as indicated. Date:  11/18/2022 Date:  11/22/2022 Date:     Activity/Exercise Parameters Parameters Parameters   Education HEP, POC, PT goals, anatomy/pathology     ROM 6min 5min    Bike      Nu step  ROM 8min    L stretch  [id number]    Calfstretch [id number] 7w28pyb    Heel toe  30x    HSS [id number] 6e57aek    Quad set 20 x 5sec 20 x 5sec    Heel slide [id number] [id number]    Heel prop 4min     LAQ  20x    SLR  15x    SB knee flexion  20x          THERAPEUTIC ACTIVITY: ( 0 minutes): Activities per gid below to improve functional movement related mobility, strength and balance to improve neuro-muscular carryover to daily functional activities for improving patient's quality of life. Required visual, verbal and manual cues to promote proper body alignment and promote proper body posture/mechanics. Progressed resistance and complexity of movement as indicated. Date:  11/22/2022 Date:   Date:     Activity/Exercise Parameters Parameters Parameters                                                   MANUAL THERAPY: (0 minutes): Joint mobilization, Soft tissue mobilization was utilized and necessary because of the patient's restricted joint motion and restricted motion of soft tissue mobility.         Date  11/22/2022    Technique Used Grade  Level # Time(s) Effect while being performed                                                                 HEP Log Date    11/22/2022   2.     3.    4.    5.              Treatment/Session Summary:    Treatment Assessment:  Pt had improved ROM and QS, reviewed importance  Communication/Consultation:  Spoke with patient in regards to gait and activity progression. Equipment provided today:  None  Recommendations/Intent for next treatment session: Next visit will focus on progression of ROM and WB.     Total Treatment Billable Duration:  39 minutes   Time In: 0635  Time Out: 0805    Ralf Varela, PT       Charge Capture  }Post Session Pain  MedBridge Portal  MD Guidelines  Scanned Media  Benefits  MyChart    Future Appointments   Date Time Provider Hernan Irby   11/28/2022  8:30 AM Brenda Ramsey MD [facility]   11/29/2022  7:15 AM Ralf Avery, PT [facility]   12/1/2022  7:15 AM Ralf Avery, PT [facility]   12/6/2022  7:15 AM Bern Avery, PT [facility]   12/8/2022  7:15 AM Ralf Avery, PT [facility]   12/13/2022  7:15 AM Ralf Avery, PT [facility]   12/15/2022  7:15 AM Ralf Avery, PT [facility]   12/20/2022  7:15 AM Ralf Avery, PT [facility]   12/22/2022  7:15 AM Ralf Avery, PT [facility]   1/3/2023  7:15 AM Bern Avery, PT [facility]   1/5/2023  7:15 AM Bern Avery, PT [facility]   1/10/2023  7:15 AM Bern Avery, PT [facility]   1/12/2023  7:15 AM Bern Avery, PT [facility]

## 2022-11-28 ENCOUNTER — OFFICE VISIT (OUTPATIENT)
Dept: ORTHOPEDIC SURGERY | Age: 42
End: 2022-11-28

## 2022-11-28 DIAGNOSIS — Z09 S/P ORTHOPEDIC SURGERY, FOLLOW-UP EXAM: Primary | ICD-10-CM

## 2022-11-28 DIAGNOSIS — S83.242D ACUTE MEDIAL MENISCUS TEAR, LEFT, SUBSEQUENT ENCOUNTER: ICD-10-CM

## 2022-11-28 NOTE — LETTER
DME Patient Authorization Form    Name: Shira Tomas  : 1980  MRN: [id number]   Age: 43 y.o. Gender: female  Delivery Address: [facility]     Diagnosis:     ICD-10-CM    1. S/P orthopedic surgery, follow-up exam  Z09       2. Acute medial meniscus tear, left, subsequent encounter  S83.242D            Requested DME:  [employer] Hinged Brace- ($170.00) X 1 - left        Clinical Notes:     **Indicates non-covered items by insurance. Payment expected on date of service. Electronically signed by  Provider: Kirt Holstein, MD__Date: 2022                            [address] Tax ID # [id number]        Durable Medical Equipment and/or Orthotics Patient Consent     I understand that my physician has prescribed this medical supply as part of my treatment plan as a matter of Medical Necessity.  I understand that I have a choice in where I receive my prescribed orthopedic supplies and/or services.  I authorize [facility] to furnish this service/product and to provide my insurance carrier with any information requested in order to process for payment.  I instruct my insurance carrier to pay [facility] directly for these services/products.  I understand that my insurance carrier may deny payment for this supply because it is a non-covered item, deemed not medically necessary or considered experimental.   I understand that any cost not covered by my insurance carrier will be solely my financial responsibility.  I have received the Supplier Standards and have reviewed them.  I have received the prescribed item and have been fully instructed on the proper use of the above services/products.    ______ (Patient Initials) I understand that all DME items are non-returnable after being dispensed.  Items still in sealed packaging may be returned up to 14 days after service. You have the right to talk in confidence with health care providers and to have your health care information protected. You have the right to receive an explanation of your bill. You have the right to complain about the service or product you receive. Patient Responsibilities:  Please provide complete and accurate information about your health insurance benefits and make arrangements for the timely payment of your bill. [facility] will, if possible, assume responsibility for billing your insurance (Medicare, Medicaid and commercial) for the prescribed equipment or devices. If your policy does not cover the prescribed product, or only covers a portion of the bill, you are responsible for any remaining balance. Return and Exchange Policy:  [facility] will honor published  Warranties for products. [facility] will accept returns or exchanges within 14 days from the date of receipt, providin) the product must be in new condition; 2) receipt as required; and 3) used disposable and hygiene products may only be returned due to a defective product. Note: Refunds will be issued in a timely manner, please allow 4-6 weeks for processing. Complaint Procedures and DME Consumer Protection Resources:  [facility] values you as a customer, and is committed to resolving patient concerns. This commitment includes understanding and documenting your concerns, conducting a review of internal procedures, and providing you with an explanation and resolution to your concerns. Should you have any questions about our services or billing process, please contact our office at (practice phone number). If we are unable to resolve the concern, you have the right to direct comments to the office of Consumer Protection, in the [address] or the [facility] office, without fear of repercussion.     DMEPOS SUPPLIER STANDARDS    A supplier must be in compliance with all applicable Federal and State licensure and regulatory requirements. A supplier must provide complete and accurate information on the DMEPOS supplier application. Any changes to this information must be reported to the [facility] within 30 days. An authorized individual (one whose signature is binding) must sign the application for billing privileges. A supplier must fill orders from its own inventory, or must contract with other companies for the purchase of items necessary to fill the order. A supplier may not contract with any entity that is currently excluded from the Medicare program, any [facility] program, or from any other Federal procurement or Nonprocurement programs. A supplier must advise beneficiaries that they may rent or purchase inexpensive or routinely purchased durable medical equipment, and of the purchase option for capped rental equipment. A supplier must notify beneficiaries of warranty coverage and honor all warranties under applicable State Law, and repair or replace free of charge Medicare covered items that are under warranty. A supplier must maintain a physical facility on an appropriate site. A supplier must permit CMS, or its agents to conduct on-site inspections to ascertain the supplier's compliance with these standards. The supplier location must be accessible to beneficiaries during reasonable business hours, and must maintain a visible sign and posted hours of operation. A supplier must maintain a primary business telephone listed under the name of the business in a [employer] or a toll free number available through directory assistance. The exclusive use of a beeper, answering machine or cell phone is prohibited. A supplier must have comprehensive liability insurance in the amount of at least $300,000 that covers both the supplier's place of business and all customers and employees of the supplier.   If the supplier manufactures its own items, this insurance must also cover product liability and completed operations. A supplier must agree not to initiate telephone contact with beneficiaries, with a few exceptions allowed. This standard prohibits suppliers from calling beneficiaries in order to solicit new business. A supplier is responsible for delivery and must instruct beneficiaries on use of Medicare covered items, and maintain proof of delivery. A supplier must answer questions, and respond to complaints of the beneficiaries, and maintain documentation of such contacts. A supplier must maintain and replace at no charge or repair directly, or through a service contract with another company, Medicare covered items it has rented to beneficiaries. A supplier must accept returns of substandard (less than full quality for the particular item) or unsuitable items (inappropriate for the beneficiary at the time it was fitted and rented or sold) from beneficiaries. A supplier must disclose these supplier standards to each beneficiary to whom it supplies a Medicare-covered item. A supplier must disclose to the government any person having ownership, financial, or control interest in the supplier. A supplier must not convey or reassign a supplier number; i.e., the supplier may not sell or allow another entity to use its Medicare billing number. A supplier must have a complaint resolution protocol established to address beneficiary complaints that relate to these standards. A record of these complaints must be maintained at the physical facility. Complaint records must include: the name, address, telephone number and health insurance claim number of the beneficiary, a summary of the complaint, and any action taken to resolve it. A supplier must agree to furnish CMS any information required by the Medicare statute and implementing regulations.   A supplier of DMEPOS and other items and services must be accredited by a CMS-approved accreditation organization in order to receive and retain a supplier billing number. The accreditation must indicate the specific products and services, for which the supplier is accredited in order for the supplier to receive payment for those specific products and services. A DMEPOS supplier must notify their accreditation organization when a new DMEPOS location is opened. The accreditation organization may accredit the new supplier location for three months after it is operational without requiring a new site visit. All DMEPOS supplier locations, whether owned or subcontracted, must meet the [employer] and be separately accredited in order to bill Medicare. An accredited supplier may be denied enrollment or their enrollment may be revoked, if CMS determines that they are not in compliance with the DMEPOS quality standards. A DMEPOS supplier must disclose upon enrollment all products and services, including the addition of new product lines for which they are seeking accreditation. If a new product line is added after enrollment, the DMEPOS supplier will be responsible for notifying the accrediting body of the new product so that the DMEPOS supplier can be re-surveyed and accredited for these new products. Must meet the surety bond requirements specified in 42 C. F.R. 424.57(c). Implementation date- May 4, 2009. A supplier must obtain oxygen from a state-licensed oxygen supplier. A supplier must maintain ordering and referring documentation consistent with provisions found in 42 C. F.R. 424.516(f). DMEPOS suppliers are prohibited from sharing a practice location with certain other Medicare providers and suppliers. DMEPOS suppliers must remain open to the public for a minimum of 30 hours per week with certain exceptions.

## 2022-11-28 NOTE — PROGRESS NOTES
Name: Devon Britt  YOB: 1980  Gender: female  MRN: [id number]      Procedure: Left Knee Arthroscopy Medial Meniscectomy - Left    Surgery Date: 11/16/2022          Subjective: Returns 12 days s/p of a knee arthroscopy. She is well but continues to have mild pain medially. Physical Examination: Incisions are healing well. Able to activate quad but has appropriate atrophy. Passive extension to about 2  degrees shy of hyperextension flexion to 90 degrees. Patella is mobile. Mild appropriate effusion. Ecchymosis around the medial joint line. Motor and sensory intact. Assessment:   1. S/P orthopedic surgery, follow-up exam    2. Acute medial meniscus tear, left, subsequent encounter           Plan:     Steri-Strips changed today. Advised ice and compression therapy. Continue PT per knee arthroscopy protocol progressing with activity gradually as tolerated. Stressed her the importance of regaining her terminal extension. Routine knee arthroscopy protocol with range of motion as tolerated and gradual progression of weightbearing as tolerated. Use the hinged knee brace for support she can have range of motion as tolerated in the brace and weightbearing as tolerated in extension in the brace. I think the majority of her symptoms currently are from the MCL sprain. The T ROM brace is sliding down on her and is uncomfortable. I will provide her with a ready hinged brace today.     Follow up: 3 weeks      Marino Ramos NP dictating as a scribe for David Castro MD

## 2022-11-28 NOTE — PROGRESS NOTES
Reddie Hinge brace for patients left knee. I explained how the hinge on each side of the brace should line up with the patella. The patient was also instructed to tighten the strap distal to the patella first to insure the brace is anchored and prevents slipping, , then the top strap should be tightened. Patient read and signed documenting they understand and agree to [facility]'s current DME return policy.

## 2022-11-29 ENCOUNTER — HOSPITAL ENCOUNTER (OUTPATIENT)
Dept: PHYSICAL THERAPY | Age: 42
Setting detail: RECURRING SERIES
Discharge: HOME | End: 2022-12-02
Payer: COMMERCIAL

## 2022-11-29 PROCEDURE — 97110 THERAPEUTIC EXERCISES: CPT

## 2022-11-29 ASSESSMENT — PAIN SCALES - GENERAL: PAINLEVEL_OUTOF10: 2

## 2022-11-29 NOTE — PROGRESS NOTES
Connie Fraga  : 1980  Primary: Charlie Barbour  Secondary:  [address] [facility] [address]  Phone: [phone]  Fax: [phone] Plan Frequency: 2 x a week  Plan of Care/Certification Expiration Date: 23      PT Visit Info:   3        OUTPATIENT PHYSICAL THERAPY:OP NOTE TYPE: Treatment Note 2022       Episode  }Appt Desk              ICD-10: Treatment Diagnosis: Pain in left knee (M25.562)  Stiffness of left knee, not elsewhere classified (M25.662)  Encounter for other specified surgical aftercare (Z48.89)   Medical/Referring Diagnosis:  Tear of medial meniscus of left knee, current, unspecified tear type, subsequent encounter [S83.242D]  Left knee pain, unspecified chronicity [M25.562]  Referring Physician:  CRISTINA Swan - *  MD Orders:  PT Eval and Treat   Date of Onset:  Onset Date: 22     Allergies:   Leuprolide and Hydromorphone  Restrictions/Precautions:  Restrictions/Precautions: Surgical protocol  No data recorded   Interventions Planned (Treatment may consist of any combination of the following):    Current Treatment Recommendations: Strengthening; ROM; Balance training; Functional mobility training; Transfer training; Endurance training; Gait training; Stair training; Neuromuscular re-education; Manual Therapy - Soft Tissue Mobilization; Return to work related activity; Home exercise program; Safety education & training; Modalities; Therapeutic activities; Wound care     Subjective Comments:  Pt reports feeling better  Initial:}    2/10Post Session:       1/10  Medications Last Reviewed:  2022  Updated Objective Findings:   ROM 3-95    Rehab protocol: Routine knee arthroscopy protocol with range of motion as tolerated and gradual progression of weightbearing as tolerated.   Use the hinged knee brace for support she can have range of motion as tolerated in the brace and weightbearing as tolerated in extension in the brace      Treatment       THERAPEUTIC EXERCISE: (40 minutes):  Exercises per grid below to improve mobility, strength and balance. Required minimal visual, verbal and manual cues to promote proper body alignment and promote proper body posture. Progressed resistance and complexity of movement as indicated. Date:  11/18/2022 Date:  11/22/2022 Date:  11/29/2022   Activity/Exercise Parameters Parameters Parameters   Education HEP, POC, PT goals, anatomy/pathology     ROM 6min 5min    Bike      Nu step  ROM 8min ROM 8min   L stretch  [id number] [id number]   Calfstretch 0l99pgc 2d73oau 9l02hfp   Heel toe  30x 30x   HSS 2i85sqs 2z66pdc 9i10uvl   Quad set 20 x 5sec 20 x 5sec    Heel slide 60j64vqp 11x91ovx    Heel prop 4min     LAQ  20x 30x w/DF   SLR  15x    SB knee flexion  20x 20x   Step fwd back right   20x   SLS left w/ opp UE LE flex   20x   Lateral walk   3 laps                                 THERAPEUTIC ACTIVITY: ( 0 minutes): Activities per gid below to improve functional movement related mobility, strength and balance to improve neuro-muscular carryover to daily functional activities for improving patient's quality of life. Required visual, verbal and manual cues to promote proper body alignment and promote proper body posture/mechanics. Progressed resistance and complexity of movement as indicated. Date:  11/29/2022 Date:   Date:     Activity/Exercise Parameters Parameters Parameters                                                   MANUAL THERAPY: (0 minutes): Joint mobilization, Soft tissue mobilization was utilized and necessary because of the patient's restricted joint motion and restricted motion of soft tissue mobility.         Date  11/29/2022    Technique Used Grade  Level # Time(s) Effect while being performed                                                                 HEP Log Date    11/29/2022   2.     3.    4.    5.              Treatment/Session Summary:    Treatment Assessment:  Pt much improved with tolerance to WB  Communication/Consultation:  Spoke with patient in regards to gait and activity progression. Equipment provided today:  None  Recommendations/Intent for next treatment session: Next visit will focus on progression of ROM and WB.     Total Treatment Billable Duration:  40 minutes   Time In: 5599  Time Out: 0805    Roberto Moss, PT       Charge Capture  }Post Session Pain  MedBridge Portal  MD Guidelines  Scanned Media  Benefits  MyChart    Future Appointments   Date Time Provider Hernan Irby   12/1/2022  7:15 AM Roberto Moss, PT [facility]   12/6/2022  7:15 AM Roberto Moss, PT [facility]   12/8/2022  7:15 AM Roberto Moss, PT [facility]   12/13/2022  7:15 AM Roberto Moss, PT [facility]   12/15/2022  7:15 AM Roberto Moss, PT [facility]   12/19/2022  9:30 AM Blanca Garvey MD [facility] AM[facility]   12/20/2022  7:15 AM Roberto Moss, PT [facility]   12/22/2022  7:15 AM Roberto Moss, PT [facility]   1/3/2023  7:15 AM Roberto Moss, PT [facility]   1/5/2023  7:15 AM Roberto Moss, PT [facility]   1/10/2023  7:15 AM Roberto Moss, PT [facility]   1/12/2023  7:15 AM Roberto Trevinor, PT [facility]

## 2022-12-01 ENCOUNTER — HOSPITAL ENCOUNTER (OUTPATIENT)
Dept: PHYSICAL THERAPY | Age: 42
Setting detail: RECURRING SERIES
End: 2022-12-01
Payer: COMMERCIAL

## 2022-12-01 NOTE — PROGRESS NOTES
Bhanu Jay Jay  : 1980  Primary: Jeana Soulier Sc  Secondary:  [address] at . [address]  [address]. [facility], [address]  [phone]   [phone]         PHYSICAL THERAPY    Patient canceled appointment today do to over sleeping and was rescheduled for tomorrow.     Venessa Banegas, PT

## 2022-12-02 ENCOUNTER — HOSPITAL ENCOUNTER (OUTPATIENT)
Dept: PHYSICAL THERAPY | Age: 42
Setting detail: RECURRING SERIES
Discharge: HOME | End: 2022-12-05
Payer: COMMERCIAL

## 2022-12-02 PROCEDURE — 97110 THERAPEUTIC EXERCISES: CPT

## 2022-12-02 ASSESSMENT — PAIN SCALES - GENERAL: PAINLEVEL_OUTOF10: 1

## 2022-12-02 NOTE — PROGRESS NOTES
Ernest Sacks  : 1980  Primary: Jose Luis Victor Sc  Secondary:  [address] @ [address] [facility] [address]  Phone: [phone]  Fax: [phone] Plan Frequency: 2 x a week  Plan of Care/Certification Expiration Date: 23      PT Visit Info:   4        OUTPATIENT PHYSICAL THERAPY:OP NOTE TYPE: Treatment Note 2022       Episode  }Appt Desk              ICD-10: Treatment Diagnosis: Pain in left knee (M25.562)  Stiffness of left knee, not elsewhere classified (M25.662)  Encounter for other specified surgical aftercare (Z48.89)   Medical/Referring Diagnosis:  Tear of medial meniscus of left knee, current, unspecified tear type, subsequent encounter [S83.242D]  Left knee pain, unspecified chronicity [M25.562]  Referring Physician:  CRISTINA Rojo - *  MD Orders:  PT Eval and Treat   Date of Onset:  Onset Date: 22     Allergies:   Leuprolide and Hydromorphone  Restrictions/Precautions:  Restrictions/Precautions: Surgical protocol  No data recorded   Interventions Planned (Treatment may consist of any combination of the following):    Current Treatment Recommendations: Strengthening; ROM; Balance training; Functional mobility training; Transfer training; Endurance training; Gait training; Stair training; Neuromuscular re-education; Manual Therapy - Soft Tissue Mobilization; Return to work related activity; Home exercise program; Safety education & training; Modalities; Therapeutic activities; Wound care     Subjective Comments:  Pt reports continued improvement  Initial:}    1/10Post Session:       1/10  Medications Last Reviewed:  2022  Updated Objective Findings:   ROM 1-112    Rehab protocol: Routine knee arthroscopy protocol with range of motion as tolerated and gradual progression of weightbearing as tolerated.   Use the hinged knee brace for support she can have range of motion as tolerated in the brace and weightbearing as tolerated in extension in the brace      Treatment       THERAPEUTIC EXERCISE: (40 minutes):  Exercises per grid below to improve mobility, strength and balance. Required minimal visual, verbal and manual cues to promote proper body alignment and promote proper body posture. Progressed resistance and complexity of movement as indicated. Date:  11/18/2022 Date:  11/22/2022 Date:  11/29/2022 Date:  12/2/2022   Activity/Exercise Parameters Parameters Parameters    Education HEP, POC, PT goals, anatomy/pathology      ROM 6min 5min     Bike    8 min seat 1   Nu step  ROM 8min ROM 8min    L stretch  66f36nbu 48j13zxi 52w93ikl   Calfstretch 6w16rbm 3i54oxa 2z85rsz 7o80gtj   Heel toe  30x 30x 30x   HSS 5e89hno 4c60zoi 6p68fyo Seated 3r91hni   Quad set 20 x 5sec 20 x 5sec     Heel slide 28i67umj 60t75lze     Heel prop 4min      LAQ  20x 30x w/DF 3x10   HSC    3x10 orange   SLR  15x     SB knee flexion  20x 20x    Step fwd back right   20x    SLS left w/ opp UE LE flex   20x    Lateral walk   3 laps 2 laps green   Monster walk    2 laps green   Shuttle    Bilat 3x10 50#                       THERAPEUTIC ACTIVITY: ( 0 minutes): Activities per gid below to improve functional movement related mobility, strength and balance to improve neuro-muscular carryover to daily functional activities for improving patient's quality of life. Required visual, verbal and manual cues to promote proper body alignment and promote proper body posture/mechanics. Progressed resistance and complexity of movement as indicated. Date:  12/2/2022 Date:   Date:     Activity/Exercise Parameters Parameters Parameters                                                   MANUAL THERAPY: (0 minutes): Joint mobilization, Soft tissue mobilization was utilized and necessary because of the patient's restricted joint motion and restricted motion of soft tissue mobility.         Date  12/2/2022    Technique Used Grade  Level # Time(s) Effect while being performed HEP Log Date    12/2/2022   2.     3.    4.    5.              Treatment/Session Summary:    Treatment Assessment:  Pt progressing well with activity  Communication/Consultation:  Spoke with patient in regards to gait and activity progression. Equipment provided today:  None  Recommendations/Intent for next treatment session: Next visit will focus on progression of ROM and WB.     Total Treatment Billable Duration:  40 minutes   Time In: 0820  Time Out: 7207    Allyson Sarahi, PT       Charge Capture  }Post Session Pain  MedBridge Portal  MD Guidelines  Scanned Media  Benefits  MyChart    Future Appointments   Date Time Provider Hernan Irby   12/6/2022  7:15 AM Allyson Rutland, PT [facility]   12/8/2022  7:15 AM Allyson Sarahi, PT [facility] [place] [facility]   12/13/2022  7:15 AM Allyson Rutland, PT [facility]   12/15/2022  7:15 AM Allyson Rutland, PT [facility]   12/19/2022  9:30 AM Verna Stanley MD [facility]   12/20/2022  7:15 AM Allyson Rutland, PT [facility]   12/22/2022  7:15 AM Allyson Sarahi, PT [facility]   1/3/2023  7:15 AM Allyson Rutland, PT [facility]   1/5/2023  7:15 AM Allyson Sarahi, PT [facility]   1/10/2023  7:15 AM Allyson Rutland, PT [facility]   1/12/2023  7:15 AM Allyson Rutland, PT [facility]

## 2022-12-06 ENCOUNTER — HOSPITAL ENCOUNTER (OUTPATIENT)
Dept: PHYSICAL THERAPY | Age: 42
Setting detail: RECURRING SERIES
Discharge: HOME | End: 2022-12-09
Payer: COMMERCIAL

## 2022-12-06 PROCEDURE — 97110 THERAPEUTIC EXERCISES: CPT

## 2022-12-06 ASSESSMENT — PAIN SCALES - GENERAL: PAINLEVEL_OUTOF10: 1

## 2022-12-06 NOTE — PROGRESS NOTES
Arie November  : 1980  Primary: Dagoberto Prajapati Sc  Secondary:  [address] [facility] [address]  Phone: [phone]  Fax: [phone] Plan Frequency: 2 x a week  Plan of Care/Certification Expiration Date: 23      PT Visit Info:   5        OUTPATIENT PHYSICAL THERAPY:OP NOTE TYPE: Treatment Note 2022       Episode  }Appt Desk              ICD-10: Treatment Diagnosis: Pain in left knee (M25.562)  Stiffness of left knee, not elsewhere classified (M25.662)  Encounter for other specified surgical aftercare (Z48.89)   Medical/Referring Diagnosis:  Tear of medial meniscus of left knee, current, unspecified tear type, subsequent encounter [S83.242D]  Left knee pain, unspecified chronicity [M25.562]  Referring Physician:  Sherley Riedel, APRN - *  MD Orders:  PT Eval and Treat   Date of Onset:  Onset Date: 22     Allergies:   Leuprolide and Hydromorphone  Restrictions/Precautions:  Restrictions/Precautions: Surgical protocol  No data recorded   Interventions Planned (Treatment may consist of any combination of the following):    Current Treatment Recommendations: Strengthening; ROM; Balance training; Functional mobility training; Transfer training; Endurance training; Gait training; Stair training; Neuromuscular re-education; Manual Therapy - Soft Tissue Mobilization; Return to work related activity; Home exercise program; Safety education & training; Modalities; Therapeutic activities; Wound care     Subjective Comments:  Pt reports mainly just stiffness and difficulty with stairs  Initial:}    1/10Post Session:       1/10  Medications Last Reviewed:  2022  Updated Objective Findings:   ROM 1-112    Rehab protocol: Routine knee arthroscopy protocol with range of motion as tolerated and gradual progression of weightbearing as tolerated.   Use the hinged knee brace for support she can have range of motion as tolerated in the brace and weightbearing as tolerated in extension in the brace      Treatment       THERAPEUTIC EXERCISE: (40 minutes):  Exercises per grid below to improve mobility, strength and balance. Required minimal visual, verbal and manual cues to promote proper body alignment and promote proper body posture. Progressed resistance and complexity of movement as indicated. Date:  11/18/2022 Date:  11/22/2022 Date:  11/29/2022 Date:  12/2/2022 Date:  12/6/2022   Activity/Exercise Parameters Parameters Parameters     Education HEP, POC, PT goals, anatomy/pathology       ROM 6min 5min      Bike    8 min seat 1 8 min seat 1   Nu step  ROM 8min ROM 8min     L stretch  51s98rrl 44y73fko 59t85rpj 13n66axn   Calfstretch 0z20dka 2r63ocz 8c61wkl 7x31fgu 0a48oyr   Heel toe  30x 30x 30x Left 3x10   HSS 6v17gty 5z76ibe 8t64bly Seated 4j82ccy    Quad set 20 x 5sec 20 x 5sec      Heel slide 00m35odq 01z17gdp      Heel prop 4min       LAQ  20x 30x w/DF 3x10    HSC    3x10 orange    SLR  15x      SB knee flexion  20x 20x     Step fwd back right   20x     SLS left w/ opp UE LE flex   20x     Lateral walk   3 laps 2 laps green 3 laps pink   Monster walk    2 laps green 3 laps pink   Shuttle    Bilat 3x10 50# Bilat 3x10 75#  L/R 2x10 25#   Squat     3x5 TRX partial   RDL     3x5 left   Stool scoot                                                      THERAPEUTIC ACTIVITY: ( 0 minutes): Activities per gid below to improve functional movement related mobility, strength and balance to improve neuro-muscular carryover to daily functional activities for improving patient's quality of life. Required visual, verbal and manual cues to promote proper body alignment and promote proper body posture/mechanics. Progressed resistance and complexity of movement as indicated.      Date:  12/6/2022 Date:   Date:     Activity/Exercise Parameters Parameters Parameters                                                   MANUAL THERAPY: (0 minutes): Joint mobilization, Soft tissue mobilization was utilized and necessary because of the patient's restricted joint motion and restricted motion of soft tissue mobility. Date  12/6/2022    Technique Used Grade  Level # Time(s) Effect while being performed                                                                 HEP Log Date    12/6/2022   2.     3.    4.    5.              Treatment/Session Summary:    Treatment Assessment:  Pt had good tolerance to increased WB  Communication/Consultation:  Spoke with patient in regards to gait and activity progression. Equipment provided today:  None  Recommendations/Intent for next treatment session: Next visit will focus on progression of ROM and WB.     Total Treatment Billable Duration:  40 minutes   Time In: 0715  Time Out: 0800    Jorge Haque PT       Charge Capture  }Post Session Pain  MedBridge Portal  MD Guidelines  Scanned Media  Benefits  MyChart    Future Appointments   Date Time Provider Hernan Irby   12/8/2022  7:15 AM Jorge Haque, PT [facility]   12/13/2022  7:15 AM Jorge Haque, PT [facility]   12/15/2022  7:15 AM Jorge Haque PT [facility]   12/19/2022  9:30 AM Kay Amador MD [facility]   12/20/2022  7:15 AM Jorge Haque, PT [facility]   12/22/2022  7:15 AM Jorge Haque, PT [facility]   1/3/2023  7:15 AM Jorge Haque, PT [facility]   1/5/2023  7:15 AM Jorge Haque, PT [facility]   1/10/2023  7:15 AM Jorge Haque, PT [facility]   1/12/2023  7:15 AM Jorge Haque, PT [facility]

## 2022-12-08 ENCOUNTER — HOSPITAL ENCOUNTER (OUTPATIENT)
Dept: PHYSICAL THERAPY | Age: 42
Setting detail: RECURRING SERIES
Discharge: HOME | End: 2022-12-11
Payer: COMMERCIAL

## 2022-12-08 PROCEDURE — 97110 THERAPEUTIC EXERCISES: CPT

## 2022-12-08 ASSESSMENT — PAIN SCALES - GENERAL: PAINLEVEL_OUTOF10: 4

## 2022-12-08 NOTE — PROGRESS NOTES
Peggy Setele  : 1980  Primary: Delvis Barbour  Secondary:  [address] @ [address] [facility] [address]  Phone: [phone]  Fax: [phone] Plan Frequency: 2 x a week  Plan of Care/Certification Expiration Date: 23      PT Visit Info:   6        OUTPATIENT PHYSICAL THERAPY:OP NOTE TYPE: Treatment Note 2022       Episode  }Appt Desk              ICD-10: Treatment Diagnosis: Pain in left knee (M25.562)  Stiffness of left knee, not elsewhere classified (M25.662)  Encounter for other specified surgical aftercare (Z48.89)   Medical/Referring Diagnosis:  Tear of medial meniscus of left knee, current, unspecified tear type, subsequent encounter [S83.242D]  Left knee pain, unspecified chronicity [M25.562]  Referring Physician:  CRISTINA Estrada - *  MD Orders:  PT Eval and Treat   Date of Onset:  Onset Date: 22     Allergies:   Leuprolide and Hydromorphone  Restrictions/Precautions:  Restrictions/Precautions: Surgical protocol  No data recorded   Interventions Planned (Treatment may consist of any combination of the following):    Current Treatment Recommendations: Strengthening; ROM; Balance training; Functional mobility training; Transfer training; Endurance training; Gait training; Stair training; Neuromuscular re-education; Manual Therapy - Soft Tissue Mobilization; Return to work related activity; Home exercise program; Safety education & training; Modalities; Therapeutic activities; Wound care     Subjective Comments:  Pt reports waking up with medial knee soreness yesterday and still sore today  Initial:}    4/10Post Session:       2/10  Medications Last Reviewed:  2022  Updated Objective Findings:   ROM 1-112    Rehab protocol: Routine knee arthroscopy protocol with range of motion as tolerated and gradual progression of weightbearing as tolerated.   Use the hinged knee brace for support she can have range of motion as tolerated in the brace and weightbearing as tolerated in extension in the brace      Treatment       THERAPEUTIC EXERCISE: (40 minutes):  Exercises per grid below to improve mobility, strength and balance. Required minimal visual, verbal and manual cues to promote proper body alignment and promote proper body posture. Progressed resistance and complexity of movement as indicated. Date:  11/18/2022 Date:  11/22/2022 Date:  11/29/2022 Date:  12/2/2022 Date:  12/6/2022 Date:  12/8/2022   Activity/Exercise Parameters Parameters Parameters      Education HEP, POC, PT goals, anatomy/pathology        ROM 6min 5min    10min   Bike    8 min seat 1 8 min seat 1    Nu step  ROM 8min ROM 8min   ROM 8min   L stretch  43f53kux 95y57mjt 38u13mjk 26z11zzn 57f89pbu   Calfstretch 0m29uwx 4q22ejn 9k13eza 4t73gbw 1i79sqt 5g97zdi 2 way   Stair flexion stretch      5r46rgs   Heel toe  30x 30x 30x Left 3x10    HSS 8p29cos 8g16zqj 7o94fxa Seated 1w18hyl  Seated 9v58gxm   Quad set 20 x 5sec 20 x 5sec    20x 5sec   Heel slide 98p89asq 35z24zvy       Heel prop 4min        LAQ  20x 30x w/DF 3x10  20x   HSC    3x10 orange     SLR  15x    Combo SAQ 15x   SB knee flexion  20x 20x   30x   SB LE Ext      30x   Step fwd back right   20x      SLS left w/ opp UE LE flex   20x      Lateral walk   3 laps 2 laps green 3 laps pink    Monster walk    2 laps green 3 laps pink    Shuttle    Bilat 3x10 50# Bilat 3x10 75#  L/R 2x10 25#    Squat     3x5 TRX partial    RDL     3x5 left    Stool scoot                                                            THERAPEUTIC ACTIVITY: ( 0 minutes): Activities per gid below to improve functional movement related mobility, strength and balance to improve neuro-muscular carryover to daily functional activities for improving patient's quality of life. Required visual, verbal and manual cues to promote proper body alignment and promote proper body posture/mechanics.  Progressed resistance and complexity of movement as indicated. Date:  12/8/2022 Date:   Date:     Activity/Exercise Parameters Parameters Parameters                                                   MANUAL THERAPY: (0 minutes): Joint mobilization, Soft tissue mobilization was utilized and necessary because of the patient's restricted joint motion and restricted motion of soft tissue mobility. Date  12/8/2022    Technique Used Grade  Level # Time(s) Effect while being performed                                                                 HEP Log Date    12/8/2022   2.     3.    4.    5.              Treatment/Session Summary:    Treatment Assessment:  Pt's symptoms related to inflamation  Communication/Consultation:  Spoke with patient in regards to gait and activity progression. Equipment provided today:  None  Recommendations/Intent for next treatment session: Next visit will focus on progression of ROM and WB.     Total Treatment Billable Duration:  40 minutes   Time In: 4243  Time Out: 0805    Carey Fees, PT       Charge Capture  }Post Session Pain  MedBridge Portal  MD Guidelines  Scanned Media  Benefits  MyChart    Future Appointments   Date Time Provider Hernan Irby   12/13/2022  7:15 AM Carey Fees, PT [facility]   12/15/2022  7:15 AM Carey Fees, PT [facility]   12/19/2022  9:30 AM Elaine Schirmer, MD [facility]   12/20/2022  7:15 AM Carey Fees, PT [facility]   12/22/2022  7:15 AM Carey Fees, PT [facility]   1/3/2023  7:15 AM Carey Fees, PT [facility]   1/5/2023  7:15 AM Carey Fees, PT [facility]   1/10/2023  7:15 AM Carey Fees, PT [facility]   1/12/2023  7:15 AM Carey Fees, PT [facility]

## 2022-12-13 ENCOUNTER — HOSPITAL ENCOUNTER (OUTPATIENT)
Dept: PHYSICAL THERAPY | Age: 42
Setting detail: RECURRING SERIES
Discharge: HOME | End: 2022-12-16
Payer: COMMERCIAL

## 2022-12-13 PROCEDURE — 97110 THERAPEUTIC EXERCISES: CPT

## 2022-12-13 ASSESSMENT — PAIN SCALES - GENERAL: PAINLEVEL_OUTOF10: 2

## 2022-12-13 NOTE — PROGRESS NOTES
Candida Smith  : 1980  Primary: Patricia Barbour  Secondary:  Theron Ahn @ [address] [place] [address]  Phone: [phone]  Fax: [phone] Plan Frequency: 2 x a week  Plan of Care/Certification Expiration Date: 23      PT Visit Info:   7        OUTPATIENT PHYSICAL THERAPY:OP NOTE TYPE: Treatment Note 2022       Episode  }Appt Desk              ICD-10: Treatment Diagnosis: Pain in left knee (M25.562)  Stiffness of left knee, not elsewhere classified (M25.662)  Encounter for other specified surgical aftercare (Z48.89)   Medical/Referring Diagnosis:  Tear of medial meniscus of left knee, current, unspecified tear type, subsequent encounter [S83.242D]  Left knee pain, unspecified chronicity [M25.562]  Referring Physician:  CRISTINA Dawn - *  MD Orders:  PT Eval and Treat   Date of Onset:  Onset Date: 22     Allergies:   Leuprolide and Hydromorphone  Restrictions/Precautions:  Restrictions/Precautions: Surgical protocol  No data recorded   Interventions Planned (Treatment may consist of any combination of the following):    Current Treatment Recommendations: Strengthening; ROM; Balance training; Functional mobility training; Transfer training; Endurance training; Gait training; Stair training; Neuromuscular re-education; Manual Therapy - Soft Tissue Mobilization; Return to work related activity; Home exercise program; Safety education & training; Modalities; Therapeutic activities; Wound care     Subjective Comments:  Pt reports feeling better this week  Initial:}    2/10Post Session:       1/10  Medications Last Reviewed:  2022  Updated Objective Findings:   ROM 1-112    Rehab protocol: Routine knee arthroscopy protocol with range of motion as tolerated and gradual progression of weightbearing as tolerated.   Use the hinged knee brace for support she can have range of motion as tolerated in the brace and weightbearing as tolerated in extension in the brace      Treatment       THERAPEUTIC EXERCISE: (40 minutes):  Exercises per grid below to improve mobility, strength and balance. Required minimal visual, verbal and manual cues to promote proper body alignment and promote proper body posture. Progressed resistance and complexity of movement as indicated. Date:  11/18/2022 Date:  11/22/2022 Date:  11/29/2022 Date:  12/2/2022 Date:  12/6/2022 Date:  12/8/2022 Date:  12/13/2022   Activity/Exercise Parameters Parameters Parameters       Education HEP, POC, PT goals, anatomy/pathology         ROM 6min 5min    10min    Bike    8 min seat 1 8 min seat 1     Nu step  ROM 8min ROM 8min   ROM 8min 8min L4   L stretch  27p51vzs 70f46dsq 05w63orh 05t16fku 74l93pzt 25k53fam   Calfstretch 3o46kxo 3g44mqa 9v34cnu 5w45ijz 1a49tzi 1c05cbc 2 way 2j16jan   Stair flexion stretch      7n48lcg 3y88jux   Heel toe  30x 30x 30x Left 3x10     HSS 6v71hif 2i25pbl 2d43hzl Seated 1o38vyf  Seated 4m93zki    Quad set 20 x 5sec 20 x 5sec    20x 5sec    Heel slide 06d77jov 64p84qnu        Heel prop 4min         LAQ  20x 30x w/DF 3x10  20x    HSC    3x10 orange      SLR  15x    Combo SAQ 15x    SB knee flexion  20x 20x   30x    SB LE Ext      30x    Step fwd back right   20x       SLS left w/ opp UE LE flex   20x       Lateral walk   3 laps 2 laps green 3 laps pink  3 laps pink   Monster walk    2 laps green 3 laps pink  3 laps pink   Shuttle    Bilat 3x10 50# Bilat 3x10 75#  L/R 2x10 25#  Bilat 3x10 87.5#  L/R 2x10 37.5#   Squat     3x5 TRX partial     RDL     3x5 left  3x5 left   Stool scoot       3 laps   [employer]       2x2 laps 2/10#   Cable pull       10x 13#   Sled                                              THERAPEUTIC ACTIVITY: ( 0 minutes): Activities per gid below to improve functional movement related mobility, strength and balance to improve neuro-muscular carryover to daily functional activities for improving patient's quality of life.  Required visual, verbal and manual cues to promote proper body alignment and promote proper body posture/mechanics. Progressed resistance and complexity of movement as indicated. Date:  12/13/2022 Date:   Date:     Activity/Exercise Parameters Parameters Parameters                                                   MANUAL THERAPY: (0 minutes): Joint mobilization, Soft tissue mobilization was utilized and necessary because of the patient's restricted joint motion and restricted motion of soft tissue mobility. Date  12/13/2022    Technique Used Grade  Level # Time(s) Effect while being performed                                                                 HEP Log Date    12/13/2022   2.     3.    4.    5.              Treatment/Session Summary:    Treatment Assessment:  Pt had good tolerance to return of strengthening  Communication/Consultation:  Spoke with patient in regards to gait and activity progression. Equipment provided today:  None  Recommendations/Intent for next treatment session: Next visit will focus on progression of ROM and WB.     Total Treatment Billable Duration:  40 minutes   Time In: 1198  Time Out: 0805    Brielle Domínguez, PT       Charge Capture  }Post Session Pain  MedBridge Portal  MD Guidelines  Scanned Media  Benefits  MyChart    Future Appointments   Date Time Provider Hernan Irby   12/15/2022  7:15 AM Brielle Domínguez, PT [facility]   12/19/2022  9:30 AM MD LONNY Eng [facility] AMB   12/20/2022  7:15 AM Brielle Domínguez, PT [facility] AND [place] [facility]   12/22/2022  7:15 AM Brielle Domínguez, PT [facility] [place] [facility]   1/3/2023  7:15 AM Brielle Domínguez, PT [facility] [place] [facility] [facility]   1/5/2023  7:15 AM [facility]uez[facility]   1/10/2023  7:15 AM Brielle Domínguez, PT [facility]   1/12/2023  7:15 AM Brielle Domínguez, PT [facility]

## 2022-12-15 ENCOUNTER — HOSPITAL ENCOUNTER (OUTPATIENT)
Dept: PHYSICAL THERAPY | Age: 42
Setting detail: RECURRING SERIES
Discharge: HOME | End: 2022-12-18
Payer: COMMERCIAL

## 2022-12-15 PROCEDURE — 97110 THERAPEUTIC EXERCISES: CPT

## 2022-12-15 ASSESSMENT — PAIN SCALES - GENERAL: PAINLEVEL_OUTOF10: 1

## 2022-12-15 NOTE — PROGRESS NOTES
Julius Base  : 1980  Primary: Tatianna Jin Barbour  Secondary:  [address] @ [address] [facility] [address]  Phone: [phone]  Fax: [phone] Plan Frequency: 2 x a week  Plan of Care/Certification Expiration Date: 23      PT Visit Info:   8        OUTPATIENT PHYSICAL THERAPY:OP NOTE TYPE: Treatment Note 12/15/2022       Episode  }Appt Desk              ICD-10: Treatment Diagnosis: Pain in left knee (M25.562)  Stiffness of left knee, not elsewhere classified (M25.662)  Encounter for other specified surgical aftercare (Z48.89)   Medical/Referring Diagnosis:  Tear of medial meniscus of left knee, current, unspecified tear type, subsequent encounter [S83.242D]  Left knee pain, unspecified chronicity [M25.562]  Referring Physician:  CRISTINA Augustin - *  MD Orders:  PT Eval and Treat   Date of Onset:  Onset Date: 22     Allergies:   Leuprolide and Hydromorphone  Restrictions/Precautions:  Restrictions/Precautions: Surgical protocol  No data recorded   Interventions Planned (Treatment may consist of any combination of the following):    Current Treatment Recommendations: Strengthening; ROM; Balance training; Functional mobility training; Transfer training; Endurance training; Gait training; Stair training; Neuromuscular re-education; Manual Therapy - Soft Tissue Mobilization; Return to work related activity; Home exercise program; Safety education & training; Modalities; Therapeutic activities; Wound care     Subjective Comments:  Patient reports just achy and stiff  Initial:}    1/10Post Session:       1/10  Medications Last Reviewed:  12/15/2022  Updated Objective Findings:   ROM 0-112    Rehab protocol: Routine knee arthroscopy protocol with range of motion as tolerated and gradual progression of weightbearing as tolerated.   Use the hinged knee brace for support she can have range of motion as tolerated in the brace and weightbearing as tolerated in extension in the brace      Treatment       THERAPEUTIC EXERCISE: (40 minutes):  Exercises per grid below to improve mobility, strength and balance. Required minimal visual, verbal and manual cues to promote proper body alignment and promote proper body posture. Progressed resistance and complexity of movement as indicated. Date:  11/22/2022 Date:  11/29/2022 Date:  12/2/2022 Date:  12/6/2022 Date:  12/8/2022 Date:  12/13/2022 Date:  12/15/2022   Activity/Exercise Parameters Parameters        Education          ROM 5min    10min     Bike   8 min seat 1 8 min seat 1      Nu step ROM 8min ROM 8min   ROM 8min 8min L4 8min L3   L stretch 40g32bjz 25i12kdr 61d67oyn 22o37icx 19p96whm 17o28kcv 30z98tad   Calfstretch 4o36ymm 3w22bqm 6g23lat 2k77qpx 3j93iuy 2 way 8e20cam 7u62xir   Stair flexion stretch     5b98voe 3f08xgq 4j04fck   Heel toe 30x 30x 30x Left 3x10   Left 3x10   HSS 4u28qcw 4a39fkw Seated 7b17rqg  Seated 1y14sam     Quad set 20 x 5sec    20x 5sec     Heel slide 04g73ele         Heel prop          LAQ 20x 30x w/DF 3x10  20x     HSC   3x10 orange       SLR 15x    Combo SAQ 15x     SB knee flexion 20x 20x   30x     SB LE Ext     30x     Step fwd back right  20x        SLS left w/ opp UE LE flex  20x        Lateral walk  3 laps 2 laps green 3 laps pink  3 laps pink 3 laps purple   Monster walk   2 laps green 3 laps pink  3 laps pink 3 laps purple   Shuttle   Bilat 3x10 50# Bilat 3x10 75#  L/R 2x10 25#  Bilat 3x10 87.5#  L/R 2x10 37.5#    Squat    3x5 TRX partial   3x5 TRX bench   RDL    3x5 left  3x5 left 3x5 TRX   Stool scoot      3 laps 4 laps   eSentire      2x2 laps 2/10# 2x2 laps 2/10#   Cable pull      10x 13#    Sled       3 laps 50#                                       THERAPEUTIC ACTIVITY: ( 0 minutes):  Activities per gid below to improve functional movement related mobility, strength and balance to improve neuro-muscular carryover to daily functional activities for improving patient's quality of life. Required visual, verbal and manual cues to promote proper body alignment and promote proper body posture/mechanics. Progressed resistance and complexity of movement as indicated. Date:  12/15/2022 Date:   Date:     Activity/Exercise Parameters Parameters Parameters                                                   MANUAL THERAPY: (0 minutes): Joint mobilization, Soft tissue mobilization was utilized and necessary because of the patient's restricted joint motion and restricted motion of soft tissue mobility. Date  12/15/2022    Technique Used Grade  Level # Time(s) Effect while being performed                                                                 HEP Log Date    12/15/2022   2.     3.    4.    5.              Treatment/Session Summary:    Treatment Assessment:  Pt progressing well with ROM and strength  Communication/Consultation:  Spoke with patient in regards to gait and activity progression. Equipment provided today:  None  Recommendations/Intent for next treatment session: Next visit will focus on progression of ROM and WB.     Total Treatment Billable Duration:  40 minutes   Time In: 0715  Time Out: 0800    Josef Swann, PT       Charge Capture  }Post Session Pain  MedBridge Portal  MD Guidelines  Scanned Media  Benefits  MyChart    Future Appointments   Date Time Provider Hernan Irby   12/19/2022  9:30 AM MD LONNY Bales [facility]   12/20/2022  7:15 AM Josef Swann, PT [facility] [place] [facility]   12/22/2022  7:15 AM Josef Swann, PT [facility] [place]   1/5/2023  7:15 AM Josef Swann, PT [facility]   1/10/2023  7:15 AM Josef Swann, PT [facility]   1/12/2023  7:15 AM Josef Swann PT [facility]

## 2022-12-19 ENCOUNTER — OFFICE VISIT (OUTPATIENT)
Dept: ORTHOPEDIC SURGERY | Age: 42
End: 2022-12-19

## 2022-12-19 DIAGNOSIS — Z09 S/P ORTHOPEDIC SURGERY, FOLLOW-UP EXAM: Primary | ICD-10-CM

## 2022-12-19 DIAGNOSIS — S83.242D ACUTE MEDIAL MENISCUS TEAR, LEFT, SUBSEQUENT ENCOUNTER: ICD-10-CM

## 2022-12-19 PROCEDURE — 99024 POSTOP FOLLOW-UP VISIT: CPT | Performed by: ORTHOPAEDIC SURGERY

## 2022-12-19 NOTE — PROGRESS NOTES
Name: Javed Robin  YOB: 1980  Gender: female  MRN: [id number]      CC: Follow-up (L knee recheck)       HPI: Javed Robin is a 43 y.o. female who returns for follow up on left knee pain. She is 5 weeks s/p of a partial medial meniscectomy. She continues to be progressing well with occasional stiffness and tenderness medially. She continues to be compliant with formal physical therapy and sees great improvement from that. Physical Examination:  General: no acute distress  Lungs: breathing easily  CV: regular rhythm by pulse  Left Knee: Portals are well-healed. Full passive and active range of motion. Good quad strength with some mild crepitus in the patellofemoral compartment. Assessment:     ICD-10-CM    1. S/P orthopedic surgery, follow-up exam  Z09       2. Acute medial meniscus tear, left, subsequent encounter  S83.242D           Plan:   She is progressing very well just needs to continue to improve with her strength. She has a good plan with physical therapy. She can advance her activities as tolerated and follow-up with me as needed            Roger Cabrera MD, [address] [facility]

## 2022-12-20 ENCOUNTER — OFFICE VISIT (OUTPATIENT)
Dept: FAMILY MEDICINE CLINIC | Facility: CLINIC | Age: 42
End: 2022-12-20
Payer: COMMERCIAL

## 2022-12-20 ENCOUNTER — HOSPITAL ENCOUNTER (OUTPATIENT)
Dept: PHYSICAL THERAPY | Age: 42
Setting detail: RECURRING SERIES
End: 2022-12-20
Payer: COMMERCIAL

## 2022-12-20 VITALS
SYSTOLIC BLOOD PRESSURE: 130 MMHG | HEIGHT: 63 IN | BODY MASS INDEX: 43.77 KG/M2 | WEIGHT: 247 LBS | DIASTOLIC BLOOD PRESSURE: 80 MMHG

## 2022-12-20 DIAGNOSIS — E11.9 TYPE 2 DIABETES MELLITUS WITHOUT COMPLICATION, WITHOUT LONG-TERM CURRENT USE OF INSULIN (HCC): ICD-10-CM

## 2022-12-20 DIAGNOSIS — F41.9 ANXIETY: ICD-10-CM

## 2022-12-20 DIAGNOSIS — R53.83 FATIGUE, UNSPECIFIED TYPE: ICD-10-CM

## 2022-12-20 DIAGNOSIS — E78.00 PURE HYPERCHOLESTEROLEMIA, UNSPECIFIED: ICD-10-CM

## 2022-12-20 DIAGNOSIS — K21.9 GASTRO-ESOPHAGEAL REFLUX DISEASE WITHOUT ESOPHAGITIS: ICD-10-CM

## 2022-12-20 DIAGNOSIS — F41.1 GENERALIZED ANXIETY DISORDER: ICD-10-CM

## 2022-12-20 DIAGNOSIS — E55.9 VITAMIN D DEFICIENCY, UNSPECIFIED: ICD-10-CM

## 2022-12-20 DIAGNOSIS — E66.01 OBESITY, MORBID (HCC): ICD-10-CM

## 2022-12-20 DIAGNOSIS — J30.1 SEASONAL ALLERGIC RHINITIS DUE TO POLLEN: ICD-10-CM

## 2022-12-20 DIAGNOSIS — I10 ESSENTIAL HYPERTENSION, BENIGN: Primary | ICD-10-CM

## 2022-12-20 LAB
BASOPHILS # BLD: 0.1 K/UL (ref 0–0.2)
BASOPHILS NFR BLD: 1 % (ref 0–2)
DIFFERENTIAL METHOD BLD: ABNORMAL
EOSINOPHIL # BLD: 0.1 K/UL (ref 0–0.8)
EOSINOPHIL NFR BLD: 1 % (ref 0.5–7.8)
ERYTHROCYTE [DISTWIDTH] IN BLOOD BY AUTOMATED COUNT: 13.6 % (ref 11.9–14.6)
HCT VFR BLD AUTO: 37 % (ref 35.8–46.3)
HGB BLD-MCNC: 11.5 G/DL (ref 11.7–15.4)
IMM GRANULOCYTES # BLD AUTO: 0 K/UL (ref 0–0.5)
IMM GRANULOCYTES NFR BLD AUTO: 0 % (ref 0–5)
LYMPHOCYTES # BLD: 3.6 K/UL (ref 0.5–4.6)
LYMPHOCYTES NFR BLD: 41 % (ref 13–44)
MCH RBC QN AUTO: 27.8 PG (ref 26.1–32.9)
MCHC RBC AUTO-ENTMCNC: 31.1 G/DL (ref 31.4–35)
MCV RBC AUTO: 89.4 FL (ref 82–102)
MONOCYTES # BLD: 0.4 K/UL (ref 0.1–1.3)
MONOCYTES NFR BLD: 5 % (ref 4–12)
NEUTS SEG # BLD: 4.6 K/UL (ref 1.7–8.2)
NEUTS SEG NFR BLD: 52 % (ref 43–78)
NRBC # BLD: 0 K/UL (ref 0–0.2)
PLATELET # BLD AUTO: 303 K/UL (ref 150–450)
PMV BLD AUTO: 10.9 FL (ref 9.4–12.3)
RBC # BLD AUTO: 4.14 M/UL (ref 4.05–5.2)
WBC # BLD AUTO: 8.8 K/UL (ref 4.3–11.1)

## 2022-12-20 PROCEDURE — 3078F DIAST BP <80 MM HG: CPT | Performed by: FAMILY MEDICINE

## 2022-12-20 PROCEDURE — 3074F SYST BP LT 130 MM HG: CPT | Performed by: FAMILY MEDICINE

## 2022-12-20 PROCEDURE — 3051F HG A1C>EQUAL 7.0%<8.0%: CPT | Performed by: FAMILY MEDICINE

## 2022-12-20 PROCEDURE — 99215 OFFICE O/P EST HI 40 MIN: CPT | Performed by: FAMILY MEDICINE

## 2022-12-20 RX ORDER — LOSARTAN POTASSIUM AND HYDROCHLOROTHIAZIDE 25; 100 MG/1; MG/1
1 TABLET ORAL DAILY
Qty: 90 TABLET | Refills: 1 | Status: SHIPPED | OUTPATIENT
Start: 2022-12-20

## 2022-12-20 RX ORDER — CLONAZEPAM 0.5 MG/1
0.5 TABLET ORAL 2 TIMES DAILY PRN
Qty: 60 TABLET | Refills: 0 | Status: SHIPPED | OUTPATIENT
Start: 2022-12-20 | End: 2023-01-19

## 2022-12-20 RX ORDER — VENLAFAXINE HYDROCHLORIDE 150 MG/1
150 CAPSULE, EXTENDED RELEASE ORAL 2 TIMES DAILY
Qty: 90 CAPSULE | Refills: 1 | Status: SHIPPED | OUTPATIENT
Start: 2022-12-20

## 2022-12-20 RX ORDER — ESOMEPRAZOLE MAGNESIUM 40 MG/1
40 CAPSULE, DELAYED RELEASE ORAL 2 TIMES DAILY
Qty: 180 CAPSULE | Refills: 1 | Status: SHIPPED | OUTPATIENT
Start: 2022-12-20

## 2022-12-20 RX ORDER — RIZATRIPTAN BENZOATE 10 MG/1
10 TABLET, ORALLY DISINTEGRATING ORAL
Qty: 30 TABLET | Refills: 1 | Status: SHIPPED | OUTPATIENT
Start: 2022-12-20 | End: 2022-12-20

## 2022-12-20 RX ORDER — ERENUMAB-AOOE 70 MG/ML
70 INJECTION SUBCUTANEOUS
Qty: 3 ADJUSTABLE DOSE PRE-FILLED PEN SYRINGE | Refills: 1 | Status: SHIPPED | OUTPATIENT
Start: 2022-12-20

## 2022-12-20 ASSESSMENT — ENCOUNTER SYMPTOMS
GASTROINTESTINAL NEGATIVE: 1
CHEST TIGHTNESS: 0
APNEA: 0
RESPIRATORY NEGATIVE: 1
EYES NEGATIVE: 1
COUGH: 0
HYPERVENTILATION: 0
SHORTNESS OF BREATH: 0
ORTHOPNEA: 0

## 2022-12-20 ASSESSMENT — PATIENT HEALTH QUESTIONNAIRE - PHQ9
SUM OF ALL RESPONSES TO PHQ QUESTIONS 1-9: 0
SUM OF ALL RESPONSES TO PHQ QUESTIONS 1-9: 0
2. FEELING DOWN, DEPRESSED OR HOPELESS: 0
1. LITTLE INTEREST OR PLEASURE IN DOING THINGS: 0
SUM OF ALL RESPONSES TO PHQ QUESTIONS 1-9: 0
SUM OF ALL RESPONSES TO PHQ QUESTIONS 1-9: 0
SUM OF ALL RESPONSES TO PHQ9 QUESTIONS 1 & 2: 0

## 2022-12-20 NOTE — PROGRESS NOTES
[address] [place] [address]  _______________________________________  Deon Lawson MD                 [address] [place] [address] Drive, [address]. MD Adithya                     [facility] [place]                                                                                    Phone: [phone]                                                                                    Fax: [phone]    Tucker Hubbard is a 43 y.o. female who is seen for evaluation of   Chief Complaint   Patient presents with    Anxiety    Hypertension    Gastroesophageal Reflux    Obesity       HPI:   Anxiety  Presents for follow-up visit. Symptoms include excessive worry. Patient reports no chest pain, compulsions, confusion, hyperventilation, impotence, insomnia, palpitations or shortness of breath. Primary symptoms comment: stable on meds in past, not doing well last few days, grandfather dementia much worse and had to admit to care facility yesterday. She feels guilty for this but has provided excellent care for him for years now. .       Hypertension  This is a chronic problem. Progression since onset: stable on meds, need reiflls and labs. Associated symptoms include anxiety and malaise/fatigue. Pertinent negatives include no chest pain, neck pain, orthopnea, palpitations or shortness of breath. Gastroesophageal Reflux  She reports no chest pain or no coughing. stable on meds in past, not doing well last few days, grandfather dementia much worse and had to admit to care facility yesterday. She feels guilty for this but has provided excellent care for him for years now. . Chronicity: GERD controlled on med, need reiflls and albs. Associated symptoms include fatigue. Sleep Problem  Episode onset: recent issue with [facility] health, finally slept ok last night, not sleeping well in past few weeks having to watch him to keep him from wandering. Associated symptoms include fatigue.  Pertinent negatives include no chest pain, chills, coughing, diaphoresis or neck pain. Abnormal Lab  Episode onset: high sugar inp ast, need recheck labs. Associated symptoms include fatigue. Pertinent negatives include no chest pain, chills, coughing, diaphoresis or neck pain. Migraine   Episode onset: recurrent migraine issues, on aimovig, doing well, need reiflls. Pertinent negatives include no coughing, insomnia or neck pain. Chief Complaint   Patient presents with    Anxiety    Hypertension    Gastroesophageal Reflux    Obesity         Review of Systems:    Review of Systems   Constitutional:  Positive for fatigue and malaise/fatigue. Negative for activity change, appetite change, chills and diaphoresis. HENT: Negative. Eyes: Negative. Respiratory: Negative. Negative for apnea, cough, chest tightness and shortness of breath. Cardiovascular:  Negative for chest pain, palpitations and orthopnea. Gastrointestinal: Negative. Endocrine: Negative. Negative for cold intolerance, heat intolerance and polyphagia. Genitourinary: Negative. Negative for impotence. Musculoskeletal: Negative. Negative for neck pain. Psychiatric/Behavioral:  Negative for confusion. The patient does not have insomnia.       History:  Past Medical History:   Diagnosis Date    Acid reflux     Acute bacterial sinusitis     Allergic rhinitis due to pollen     Anemia     never required transfusion or iron infusions    Anxiety state, unspecified     Asthma     inhaler PRN/ no current Rx inhaler - last required ~2020    Chronic sinusitis     Cough     COVID-19 10/2021    denies hospitalization    Deviated nasal septum     Endometriosis     Essential hypertension, benign     controlled with meds    Fibrocystic breast     GERD (gastroesophageal reflux disease)     medication, well controlled    Headache     High blood pressure     Hypertrophy of nasal turbinates     Other ill-defined conditions(799.89)     current sinus infection of 10 week duration    Ovarian cyst     PCOS (polycystic ovarian syndrome)     Pelvic inflammation in female     Pneumonia 2002    RLL, hospitalized    PONV (postoperative nausea and vomiting)     Postoperative urinary retention     multiple times, has required murry for several days    Preeclampsia     Sinus pain     Thyroid disease     reports small nodule - no medication or surgery    URI, acute     UTI (urinary tract infection)        Past Surgical History:   Procedure Laterality Date    CARPAL TUNNEL RELEASE Bilateral     CHOLECYSTECTOMY      COLONOSCOPY      normal no polyps, Dr Belinda Glover (CERVIX STATUS UNKNOWN)      KNEE ARTHROSCOPY Left 11/16/2022    LEFT KNEE ARTHROSCOPY MEDIAL MENISCECTOMY performed by Cheri Caldera MD at [facility] [address] SEPTOPLASTY  2015    septoplasty w/ bone spur removal, turbinates shaved; Dr. Mery Livingston @ [address] [place] [address]  2009    SMRITs, Balloon Sinuplasty       Family History   Problem Relation Age of Onset    Colon Cancer Mother     Cancer Mother         pancreatic cancer    High Cholesterol Mother     Breast Cancer Other 28        mat cousin    Lung Disease Neg Hx     Breast Cancer Maternal Aunt     Heart Disease Father        Social History     Tobacco Use    Smoking status: Never    Smokeless tobacco: Never   Substance Use Topics    Alcohol use: No       Allergies   Allergen Reactions    Leuprolide Anaphylaxis and Swelling     Difficulty breathing.  Hydromorphone Itching    Sulfa Antibiotics Other (See Comments) and Rash     As a child       Current Outpatient Medications   Medication Sig Dispense Refill    clonazePAM (KLONOPIN) 0.5 MG tablet Take 1 tablet by mouth 2 times daily as needed for Anxiety (anxiety) for up to 30 days.  60 tablet 0    venlafaxine (EFFEXOR XR) 150 MG extended release capsule Take 1 capsule by mouth 2 times daily 90 capsule 1    metFORMIN (GLUCOPHAGE) 500 MG tablet Take 1 tablet by mouth daily (with breakfast) 180 tablet 1    losartan-hydroCHLOROthiazide (HYZAAR) 100-25 MG per tablet Take 1 tablet by mouth daily TAKE 1 TABLET DAILY 90 tablet 1    Erenumab-aooe (AIMOVIG) 70 MG/ML SOAJ Inject 70 mg into the skin every 30 days 3 Adjustable Dose Pre-filled Pen Syringe 1    rizatriptan (MAXALT-MLT) 10 MG disintegrating tablet Take 1 tablet by mouth once as needed for Migraine May repeat in 2 hours if needed 30 tablet 1    esomeprazole (NEXIUM) 40 MG delayed release capsule Take 1 capsule by mouth in the morning and at bedtime 180 capsule 1    acetaminophen (TYLENOL) 500 MG tablet Take 1,000 mg by mouth in the morning and at bedtime      OZEMPIC, 1 MG/DOSE, 4 MG/3ML SOPN INJECT 1 MG UNDER THE SKIN EVERY 7 DAYS 3 mL 3    ondansetron (ZOFRAN-ODT) 8 MG TBDP disintegrating tablet Place 8 mg under the tongue every 8 hours as needed      Accu-Chek Softclix Lancets MISC USE TO CHECK BLOOD SUGAR DAILY      Semaglutide, 2 MG/DOSE, 8 MG/3ML SOPN Inject 2 mg into the skin once a week 9 mL 2    cetirizine (ZYRTEC) 10 MG tablet Take 20 mg by mouth daily      vitamin D 25 MCG (1000 UT) CAPS Take by mouth daily       No current facility-administered medications for this visit. Vitals:    /80 (Site: Left Upper Arm, Position: Sitting, Cuff Size: Large Adult)   Ht 5' 3\" (1.6 m)   Wt 247 lb (112 kg)   BMI 43.75 kg/m²     Physical Exam:  Physical Exam    Assessment/Plan:     ICD-10-CM    1. Essential hypertension, benign  I10 CBC with Auto Differential     Comprehensive Metabolic Panel     losartan-hydroCHLOROthiazide (HYZAAR) 100-25 MG per tablet     Comprehensive Metabolic Panel     CBC with Auto Differential      2. Seasonal allergic rhinitis due to pollen  J30.1       3. Obesity, morbid ([facility] [address]  E66.01       4.  Type 2 diabetes mellitus without complication, without long-term current use of insulin (HCC)  E11.9 Hemoglobin A1C     metFORMIN (GLUCOPHAGE) 500 MG tablet Hemoglobin A1C      5. Gastro-esophageal reflux disease without esophagitis  K21.9 esomeprazole (NEXIUM) 40 MG delayed release capsule      6. Generalized anxiety disorder  F41.1 venlafaxine (EFFEXOR XR) 150 MG extended release capsule      7. Pure hypercholesterolemia, unspecified  E78.00 Lipid Panel     Lipid Panel      8. Vitamin D deficiency, unspecified  E55.9       9. Fatigue, unspecified type  R53.83 TSH     TSH      10. Anxiety  F41.9 clonazePAM (KLONOPIN) 0.5 MG tablet        Total of 48 min spent in this encounter including chart review, face to face time with pt and also post visit documentation in EMR. Pt with severe distress and reactive anxiety/depression from having to place her grandfather in nursing home yesterday. Lengthy discussion regarding this today. Labs and medicines sent and pending as appropriate  Encourage low salt diet with exercise as tolerated  Encourage weight control efforts to reduce overall health risks in future  Encourage monitor BP at home   Recheck in 6 months or as needed for other problems.      Jt White MD

## 2022-12-20 NOTE — PROGRESS NOTES
Khadar Cole  : 1980  Primary: Jose E Porras Sc  Secondary:  [address]  at . [address]  [address]. [facility], [address]  [phone]   [phone]         PHYSICAL THERAPY    Patient canceled appointment today do to schedule conflict.     Payton Flowers, PT

## 2022-12-21 LAB
ALBUMIN SERPL-MCNC: 3.9 G/DL (ref 3.5–5)
ALBUMIN/GLOB SERPL: 1.1 {RATIO} (ref 0.4–1.6)
ALP SERPL-CCNC: 90 U/L (ref 50–136)
ALT SERPL-CCNC: 42 U/L (ref 12–65)
ANION GAP SERPL CALC-SCNC: 6 MMOL/L (ref 2–11)
AST SERPL-CCNC: 16 U/L (ref 15–37)
BILIRUB SERPL-MCNC: 0.3 MG/DL (ref 0.2–1.1)
BUN SERPL-MCNC: 11 MG/DL (ref 6–23)
CALCIUM SERPL-MCNC: 9.7 MG/DL (ref 8.3–10.4)
CHLORIDE SERPL-SCNC: 103 MMOL/L (ref 101–110)
CHOLEST SERPL-MCNC: 211 MG/DL
CO2 SERPL-SCNC: 29 MMOL/L (ref 21–32)
CREAT SERPL-MCNC: 0.8 MG/DL (ref 0.6–1)
EST. AVERAGE GLUCOSE BLD GHB EST-MCNC: 137 MG/DL
GLOBULIN SER CALC-MCNC: 3.4 G/DL (ref 2.8–4.5)
GLUCOSE SERPL-MCNC: 152 MG/DL (ref 65–100)
HBA1C MFR BLD: 6.4 % (ref 4.8–5.6)
HDLC SERPL-MCNC: 52 MG/DL (ref 40–60)
HDLC SERPL: 4.1 {RATIO}
LDLC SERPL CALC-MCNC: 114.8 MG/DL
POTASSIUM SERPL-SCNC: 3.7 MMOL/L (ref 3.5–5.1)
PROT SERPL-MCNC: 7.3 G/DL (ref 6.3–8.2)
SODIUM SERPL-SCNC: 138 MMOL/L (ref 133–143)
TRIGL SERPL-MCNC: 221 MG/DL (ref 35–150)
TSH, 3RD GENERATION: 0.98 UIU/ML (ref 0.36–3.74)
VLDLC SERPL CALC-MCNC: 44.2 MG/DL (ref 6–23)

## 2022-12-22 ENCOUNTER — HOSPITAL ENCOUNTER (OUTPATIENT)
Dept: PHYSICAL THERAPY | Age: 42
Setting detail: RECURRING SERIES
Discharge: HOME | End: 2022-12-25
Payer: COMMERCIAL

## 2022-12-22 PROCEDURE — 97110 THERAPEUTIC EXERCISES: CPT

## 2022-12-22 ASSESSMENT — PAIN SCALES - GENERAL: PAINLEVEL_OUTOF10: 1

## 2022-12-22 NOTE — PROGRESS NOTES
Javed Robin  : 1980  Primary: Kody Maunabo Sc  Secondary:  [address] @ [address] [place] [address]  Phone: [phone]  Fax: [phone] Plan Frequency: 2 x a week  Plan of Care/Certification Expiration Date: 23      PT Visit Info:   9        OUTPATIENT PHYSICAL THERAPY:OP NOTE TYPE: Treatment Note 2022       Episode  }Appt Desk              ICD-10: Treatment Diagnosis: Pain in left knee (M25.562)  Stiffness of left knee, not elsewhere classified (M25.662)  Encounter for other specified surgical aftercare (Z48.89)   Medical/Referring Diagnosis:  Tear of medial meniscus of left knee, current, unspecified tear type, subsequent encounter [S83.242D]  Left knee pain, unspecified chronicity [M25.562]  Referring Physician:  CRISTINA Garcia - *  MD Orders:  PT Eval and Treat   Date of Onset:  Onset Date: 22     Allergies:   Leuprolide, Hydromorphone, and Sulfa antibiotics  Restrictions/Precautions:  Restrictions/Precautions: Surgical protocol  No data recorded   Interventions Planned (Treatment may consist of any combination of the following):    Current Treatment Recommendations: Strengthening; ROM; Balance training; Functional mobility training; Transfer training; Endurance training; Gait training; Stair training; Neuromuscular re-education; Manual Therapy - Soft Tissue Mobilization; Return to work related activity; Home exercise program; Safety education & training; Modalities; Therapeutic activities; Wound care     Subjective Comments:  Pt reports sore and stiff, MD was pleased with progress  Initial:}    1/10Post Session:       1/10  Medications Last Reviewed:  2022  Updated Objective Findings:   ROM 0-112    Rehab protocol: Routine knee arthroscopy protocol with range of motion as tolerated and gradual progression of weightbearing as tolerated.   Use the hinged knee brace for support she can have range of motion as tolerated in the brace and weightbearing as tolerated in extension in the brace      Treatment       THERAPEUTIC EXERCISE: (40 minutes):  Exercises per grid below to improve mobility, strength and balance. Required minimal visual, verbal and manual cues to promote proper body alignment and promote proper body posture. Progressed resistance and complexity of movement as indicated. Date:  11/29/2022 Date:  12/2/2022 Date:  12/6/2022 Date:  12/8/2022 Date:  12/13/2022 Date:  12/15/2022 Date:  12/22/2022   Activity/Exercise Parameters         Education          ROM    10min      Bike  8 min seat 1 8 min seat 1       Nu step ROM 8min   ROM 8min 8min L4 8min L3 8min L3   L stretch 26i42ckq 40m67vrq 51u54ksy 39b61nqv 90y08diq 48m74lck 22c12kkf   Calfstretch 1k90feu 5y64ean 9d69lmr 0h79lkk 2 way 4y77wia 6f98bno 2n55qcv   Stair flexion stretch    0z06uqm 1a81nuw 8k64uaq    Heel toe 30x 30x Left 3x10   Left 3x10 Left 3x10 Airex   HSS 8l51aer Seated 7i76zxc  Seated 5p09fuq      Quad set    20x 5sec      Heel slide          Heel prop          LAQ 30x w/DF 3x10  20x      HSC  3x10 orange        SLR    Combo SAQ 15x      SB knee flexion 20x   30x      SB LE Ext    30x      Step fwd back right 20x         SLS left w/ opp UE LE flex 20x         Lateral walk 3 laps 2 laps green 3 laps pink  3 laps pink 3 laps purple 3 laps purple   Monster walk  2 laps green 3 laps pink  3 laps pink 3 laps purple 3 laps purple   Shuttle  Bilat 3x10 50# Bilat 3x10 75#  L/R 2x10 25#  Bilat 3x10 87.5#  L/R 2x10 37.5#     Squat   3x5 TRX partial   3x5 TRX bench 3x5 TRX   RDL   3x5 left  3x5 left 3x5 TRX 3x5 TRX   Stool scoot     3 laps 4 laps 4 laps   ClinicalBox     2x2 laps 2/10# 2x2 laps 2/10# 2x2 laps 2/15#   Cable pull     10x 13#     Sled      3 laps 50#    Hip 3way        2x10 Airex                             THERAPEUTIC ACTIVITY: ( 0 minutes):  Activities per gid below to improve functional movement related mobility, strength and balance to improve neuro-muscular carryover to daily functional activities for improving patient's quality of life. Required visual, verbal and manual cues to promote proper body alignment and promote proper body posture/mechanics. Progressed resistance and complexity of movement as indicated. Date:  12/22/2022 Date:   Date:     Activity/Exercise Parameters Parameters Parameters                                                   MANUAL THERAPY: (0 minutes): Joint mobilization, Soft tissue mobilization was utilized and necessary because of the patient's restricted joint motion and restricted motion of soft tissue mobility. Date  12/22/2022    Technique Used Grade  Level # Time(s) Effect while being performed                                                                 HEP Log Date    12/22/2022   2.     3.    4.    5.              Treatment/Session Summary:    Treatment Assessment:  Added unstable surface to address ankle instability with knee strengthening  Communication/Consultation:  Spoke with patient in regards to gait and activity progression. Equipment provided today:  None  Recommendations/Intent for next treatment session: Next visit will focus on progression of ROM and WB.     Total Treatment Billable Duration:  40 minutes   Time In: 5319  Time Out: 0805    Christopher Sims PT       Charge Capture  }Post Session Pain  MedBridge Portal  MD Guidelines  Scanned Media  Benefits  MyChart    Future Appointments   Date Time Provider Hernan Irby   1/5/2023  7:15 AM Christopher Sims PT [facility]   1/10/2023  7:15 AM Christopher iSms PT [facility]   1/12/2023  7:15 AM Christopher Sims PT [facility]   4/18/2023  9:30 AM MD ALFRED Torres AMB

## 2023-01-03 ENCOUNTER — APPOINTMENT (OUTPATIENT)
Dept: PHYSICAL THERAPY | Age: 43
End: 2023-01-03
Payer: COMMERCIAL

## 2023-01-05 ENCOUNTER — HOSPITAL ENCOUNTER (OUTPATIENT)
Dept: PHYSICAL THERAPY | Age: 43
Setting detail: RECURRING SERIES
Discharge: HOME | End: 2023-01-08
Payer: COMMERCIAL

## 2023-01-05 PROCEDURE — 97110 THERAPEUTIC EXERCISES: CPT

## 2023-01-05 ASSESSMENT — PAIN SCALES - GENERAL: PAINLEVEL_OUTOF10: 0

## 2023-01-05 NOTE — PROGRESS NOTES
Khalif Hall  : 1980  Primary: Kranthi Barbour  Secondary:  [address] @ [address] [facility] [address]  Phone: [phone]  Fax: [phone] Plan Frequency: 2 x a week  Plan of Care/Certification Expiration Date: 23      PT Visit Info:   10        OUTPATIENT PHYSICAL THERAPY:OP NOTE TYPE: Treatment Note 2023       Episode  }Appt Desk              ICD-10: Treatment Diagnosis: Pain in left knee (M25.562)  Stiffness of left knee, not elsewhere classified (M25.662)  Encounter for other specified surgical aftercare (Z48.89)   Medical/Referring Diagnosis:  Tear of medial meniscus of left knee, current, unspecified tear type, subsequent encounter [S83.242D]  Left knee pain, unspecified chronicity [M25.562]  Referring Physician:  CRISTINA Juan - *  MD Orders:  PT Eval and Treat   Date of Onset:  Onset Date: 22     Allergies:   Leuprolide, Hydromorphone, and Sulfa antibiotics  Restrictions/Precautions:  Restrictions/Precautions: Surgical protocol  No data recorded   Interventions Planned (Treatment may consist of any combination of the following):    Current Treatment Recommendations: Strengthening; ROM; Balance training; Functional mobility training; Transfer training; Endurance training; Gait training; Stair training; Neuromuscular re-education; Manual Therapy - Soft Tissue Mobilization; Return to work related activity; Home exercise program; Safety education & training; Modalities; Therapeutic activities; Wound care     Subjective Comments:  Pt reports doing much better and able to walk longer  Initial:}    0/10Post Session:       0/10  Medications Last Reviewed:  2023  Updated Objective Findings:   ROM 0-120    Rehab protocol: Routine knee arthroscopy protocol with range of motion as tolerated and gradual progression of weightbearing as tolerated.   Use the hinged knee brace for support she can have range of motion as tolerated in the brace and weightbearing as tolerated in extension in the brace      Treatment       THERAPEUTIC EXERCISE: (40 minutes):  Exercises per grid below to improve mobility, strength and balance. Required minimal visual, verbal and manual cues to promote proper body alignment and promote proper body posture. Progressed resistance and complexity of movement as indicated.      Date:  11/29/2022 Date:  12/2/2022 Date:  12/6/2022 Date:  12/8/2022 Date:  12/13/2022 Date:  12/15/2022 Date:  12/22/2022 Date:  1/5/2023   Education           ROM    10min       Bike  8 min seat 1 8 min seat 1        Nu step ROM 8min   ROM 8min 8min L4 8min L3 8min L3 8min L4   L stretch 89n35jhw 92x33hfi 41w39jsz 87d57qhv 13u49koe 69a83orc 00b35oqb 92q83ljn   Calfstretch 3h30seh 7n50qkw 2x50hmh 4v10dqr 2 way 1l19bsb 1h73eob 0m94jnk 5c00eef   Stair flexion stretch    5n66bfu 0m00aeh 2s94gyg  0g21azw   Heel toe 30x 30x Left 3x10   Left 3x10 Left 3x10 Airex    HSS 9h85jhb Seated 5l54ikb  Seated 0r28aoh       Quad set    20x 5sec       Heel slide           Heel prop           LAQ 30x w/DF 3x10  20x       HSC  3x10 orange         SLR    Combo SAQ 15x       SB knee flexion 20x   30x       SB LE Ext    30x       Step fwd back right 20x          SLS left w/ opp UE LE flex 20x          Lateral walk 3 laps 2 laps green 3 laps pink  3 laps pink 3 laps purple 3 laps purple 3 laps blue   Monster walk  2 laps green 3 laps pink  3 laps pink 3 laps purple 3 laps purple 3 laps blue   Shuttle  Bilat 3x10 50# Bilat 3x10 75#  L/R 2x10 25#  Bilat 3x10 87.5#  L/R 2x10 37.5#      Squat   3x5 TRX partial   3x5 TRX bench 3x5 TRX 3x5 TRX box side   RDL   3x5 left  3x5 left 3x5 TRX 3x5 TRX 3x5 TRX   Stool scoot     3 laps 4 laps 4 laps 4 laps   [facility]     2x2 laps 2/10# 2x2 laps 2/10# 2x2 laps 2/15# 2x2 laps 2/15#   Cable pull     10x 13#      Sled      3 laps 50#  3 laps 75#   Hip 3way        2x10 Airex                                THERAPEUTIC ACTIVITY: ( 0 minutes): Activities per gid below to improve functional movement related mobility, strength and balance to improve neuro-muscular carryover to daily functional activities for improving patient's quality of life. Required visual, verbal and manual cues to promote proper body alignment and promote proper body posture/mechanics. Progressed resistance and complexity of movement as indicated. Date:  1/5/2023 Date:   Date:     Activity/Exercise Parameters Parameters Parameters                                                   MANUAL THERAPY: (0 minutes): Joint mobilization, Soft tissue mobilization was utilized and necessary because of the patient's restricted joint motion and restricted motion of soft tissue mobility. Date  1/5/2023    Technique Used Grade  Level # Time(s) Effect while being performed                                                                 HEP Log Date    1/5/2023   2.     3.    4.    5.              Treatment/Session Summary:    Treatment Assessment:  Pt progressing well with stability and strength  Communication/Consultation:  Spoke with patient in regards to gait and activity progression. Equipment provided today:  None  Recommendations/Intent for next treatment session: Next visit will focus on progression of ROM and WB.     Total Treatment Billable Duration:  40 minutes   Time In: 0720  Time Out: 0805    Everardo Gupta PT       Charge Capture  }Post Session Pain  MedBridge Portal  MD Guidelines  Scanned Media  Benefits  MyChart    Future Appointments   Date Time Provider Hernan Irby   1/10/2023  7:15 AM Everardo Gupta PT [facility] AND [facility]   1/12/2023  7:15 AM Everardo Gupta PT [facility]   4/18/2023  9:30 AM MD ALFRED Musa AMB

## 2023-01-10 ENCOUNTER — HOSPITAL ENCOUNTER (OUTPATIENT)
Dept: PHYSICAL THERAPY | Age: 43
Setting detail: RECURRING SERIES
Discharge: HOME | End: 2023-01-13
Payer: COMMERCIAL

## 2023-01-10 PROCEDURE — 97110 THERAPEUTIC EXERCISES: CPT

## 2023-01-10 ASSESSMENT — PAIN SCALES - GENERAL: PAINLEVEL_OUTOF10: 0

## 2023-01-10 NOTE — PROGRESS NOTES
Nuris Vazquez  : 1980  Primary: Adeline Sanders Sc  Secondary:  [address] @ [address] [facility] [address]  Phone: [phone]  Fax: [phone] Plan Frequency: 2 x a week  Plan of Care/Certification Expiration Date: 23      PT Visit Info:   11        OUTPATIENT PHYSICAL THERAPY:OP NOTE TYPE: Treatment Note 1/10/2023       Episode  }Appt Desk              ICD-10: Treatment Diagnosis: Pain in left knee (M25.562)  Stiffness of left knee, not elsewhere classified (M25.662)  Encounter for other specified surgical aftercare (Z48.89)   Medical/Referring Diagnosis:  Tear of medial meniscus of left knee, current, unspecified tear type, subsequent encounter [S83.242D]  Left knee pain, unspecified chronicity [M25.562]  Referring Physician:  CRISTINA Arnold - *  MD Orders:  PT Eval and Treat   Date of Onset:  Onset Date: 22     Allergies:   Leuprolide, Hydromorphone, and Sulfa antibiotics  Restrictions/Precautions:  Restrictions/Precautions: Surgical protocol  No data recorded   Interventions Planned (Treatment may consist of any combination of the following):    Current Treatment Recommendations: Strengthening; ROM; Balance training; Functional mobility training; Transfer training; Endurance training; Gait training; Stair training; Neuromuscular re-education; Manual Therapy - Soft Tissue Mobilization; Return to work related activity; Home exercise program; Safety education & training; Modalities; Therapeutic activities; Wound care     Subjective Comments:  Pt reports doing well  Initial:}    0/10Post Session:       0/10  Medications Last Reviewed:  1/10/2023  Updated Objective Findings:   ROM 0-120    Rehab protocol: Routine knee arthroscopy protocol with range of motion as tolerated and gradual progression of weightbearing as tolerated.   Use the hinged knee brace for support she can have range of motion as tolerated in the brace and weightbearing as tolerated in extension in the brace      Treatment       THERAPEUTIC EXERCISE: (40 minutes):  Exercises per grid below to improve mobility, strength and balance. Required minimal visual, verbal and manual cues to promote proper body alignment and promote proper body posture. Progressed resistance and complexity of movement as indicated. Date:  12/2/2022 Date:  12/6/2022 Date:  12/8/2022 Date:  12/13/2022 Date:  12/15/2022 Date:  12/22/2022 Date:  1/5/2023 Date:  1/10/2023   Education           ROM   10min        Bike 8 min seat 1 8 min seat 1         Nu step   ROM 8min 8min L4 8min L3 8min L3 8min L4 8min L4   L stretch 37t58yue 15r78xew 36f54edd 17l21mzy 28w42war 20e58mpb 83l84brs 33g92bsh   Calfstretch 1p80lsu 6q04iqw 0m43dir 2 way 4z87cir 0f32wsr 0u19dsa 0q17cjz 3h25yva   Stair flexion stretch   2i41axa 2m40btc 0d09reg  2e69abt 8s27iiz   Heel toe 30x Left 3x10   Left 3x10 Left 3x10 Airex  Left 3x10   HSS Seated 6j50hhc  Seated 0v45eaf        Step fwd back right           SLS left w/ opp UE LE flex           Lateral walk 2 laps green 3 laps pink  3 laps pink 3 laps purple 3 laps purple 3 laps blue 3 laps blue   Monster walk 2 laps green 3 laps pink  3 laps pink 3 laps purple 3 laps purple 3 laps blue 3 laps blue   Shuttle Bilat 3x10 50# Bilat 3x10 75#  L/R 2x10 25#  Bilat 3x10 87.5#  L/R 2x10 37.5#       Squat  3x5 TRX partial   3x5 TRX bench 3x5 TRX 3x5 TRX box side Air 3x5  2/10#   RDL  3x5 left  3x5 left 3x5 TRX 3x5 TRX 3x5 TRX 3x5 Reg 3#   Stool scoot    3 laps 4 laps 4 laps 4 laps 3 laps carpet   [employer]    2x2 laps 2/10# 2x2 laps 2/10# 2x2 laps 2/15# 2x2 laps 2/15#    Cable pull    10x 13#       Sled     3 laps 50#  3 laps 75#    Hip 3way       2x10 Airex     Walking Lunge        1 lap wall support                    THERAPEUTIC ACTIVITY: ( 0 minutes):  Activities per gid below to improve functional movement related mobility, strength and balance to improve neuro-muscular carryover to daily functional activities for improving patient's quality of life. Required visual, verbal and manual cues to promote proper body alignment and promote proper body posture/mechanics. Progressed resistance and complexity of movement as indicated. Date:  1/10/2023 Date:   Date:     Activity/Exercise Parameters Parameters Parameters                                                   MANUAL THERAPY: (0 minutes): Joint mobilization, Soft tissue mobilization was utilized and necessary because of the patient's restricted joint motion and restricted motion of soft tissue mobility. Date  1/10/2023    Technique Used Grade  Level # Time(s) Effect while being performed                                                                 HEP Log Date    1/10/2023   2.     3.    4.    5.              Treatment/Session Summary:    Treatment Assessment:  Pt was challenged with progression of exercise  Communication/Consultation:  Spoke with patient in regards to gait and activity progression. Equipment provided today:  None  Recommendations/Intent for next treatment session: Next visit will focus on progression of ROM and WB.     Total Treatment Billable Duration:  40 minutes   Time In: 0715  Time Out: 0800    Ravi Castillo PT       Charge Capture  }Post Session Pain  MedBridge Portal  MD Guidelines  Scanned Media  Benefits  MyChart    Future Appointments   Date Time Provider Hernan Irby   1/12/2023  7:15 AM Ravi Castillo PT [facility] AND [facility]   4/18/2023  9:30 AM MD ALFRED Machado AMB

## 2023-01-12 ENCOUNTER — HOSPITAL ENCOUNTER (OUTPATIENT)
Dept: PHYSICAL THERAPY | Age: 43
Setting detail: RECURRING SERIES
End: 2023-01-12
Payer: COMMERCIAL

## 2023-01-12 NOTE — PROGRESS NOTES
Tucker Hubbard  : 1980  Primary: Virgie Cushing Sc  Secondary:  [address]  at . [place] [address]  [address]. [facility], [address]  [phone]   [phone]         PHYSICAL THERAPY    Patient canceled appointment today do to being sick and will call back to schedule last visit.     Judie Mercado, PT

## 2023-01-25 ENCOUNTER — TELEPHONE (OUTPATIENT)
Dept: FAMILY MEDICINE CLINIC | Facility: CLINIC | Age: 43
End: 2023-01-25

## 2023-01-25 DIAGNOSIS — F41.9 ANXIETY: Primary | ICD-10-CM

## 2023-01-25 RX ORDER — CLONAZEPAM 0.5 MG/1
0.5 TABLET ORAL DAILY PRN
Qty: 30 TABLET | Refills: 0 | Status: SHIPPED | OUTPATIENT
Start: 2023-01-25 | End: 2023-02-24

## 2023-01-25 RX ORDER — HYDROXYZINE HYDROCHLORIDE 25 MG/1
25 TABLET, FILM COATED ORAL NIGHTLY
Qty: 30 TABLET | Refills: 0 | Status: SHIPPED | OUTPATIENT
Start: 2023-01-25 | End: 2023-02-24

## 2023-01-25 NOTE — TELEPHONE ENCOUNTER
Pt called said her dad passed away last week and she is having a hard time with it. Would like another Rx for Clonazepam and also something to help her sleep. She has been taking OTC sleep aid and it is not helping. Please advise. 12/20/22 was last filled Rx per     I have reviewed the patients controlled substance prescription history, as maintained in the [facility] prescription monitoring program, so that the prescription(s) for a  controlled substance can be given.

## 2023-04-18 ENCOUNTER — OFFICE VISIT (OUTPATIENT)
Dept: FAMILY MEDICINE CLINIC | Facility: CLINIC | Age: 43
End: 2023-04-18
Payer: COMMERCIAL

## 2023-04-18 VITALS
WEIGHT: 247 LBS | DIASTOLIC BLOOD PRESSURE: 78 MMHG | BODY MASS INDEX: 43.77 KG/M2 | HEIGHT: 63 IN | SYSTOLIC BLOOD PRESSURE: 128 MMHG

## 2023-04-18 DIAGNOSIS — E11.9 TYPE 2 DIABETES MELLITUS WITHOUT COMPLICATION, WITHOUT LONG-TERM CURRENT USE OF INSULIN (HCC): Primary | ICD-10-CM

## 2023-04-18 DIAGNOSIS — F32.0 MAJOR DEPRESSIVE DISORDER, SINGLE EPISODE, MILD (HCC): ICD-10-CM

## 2023-04-18 DIAGNOSIS — F41.9 ANXIETY: ICD-10-CM

## 2023-04-18 DIAGNOSIS — E78.00 PURE HYPERCHOLESTEROLEMIA, UNSPECIFIED: ICD-10-CM

## 2023-04-18 DIAGNOSIS — F51.01 PRIMARY INSOMNIA: ICD-10-CM

## 2023-04-18 DIAGNOSIS — F41.1 GENERALIZED ANXIETY DISORDER: ICD-10-CM

## 2023-04-18 DIAGNOSIS — G43.C0 PERIODIC HEADACHE SYNDROME, NOT INTRACTABLE: ICD-10-CM

## 2023-04-18 DIAGNOSIS — E66.01 OBESITY, CLASS III, BMI 40-49.9 (MORBID OBESITY) (HCC): ICD-10-CM

## 2023-04-18 DIAGNOSIS — R11.0 NAUSEA: ICD-10-CM

## 2023-04-18 DIAGNOSIS — E55.9 VITAMIN D DEFICIENCY, UNSPECIFIED: ICD-10-CM

## 2023-04-18 DIAGNOSIS — I10 ESSENTIAL HYPERTENSION, BENIGN: ICD-10-CM

## 2023-04-18 DIAGNOSIS — K21.9 GASTRO-ESOPHAGEAL REFLUX DISEASE WITHOUT ESOPHAGITIS: ICD-10-CM

## 2023-04-18 DIAGNOSIS — J30.1 SEASONAL ALLERGIC RHINITIS DUE TO POLLEN: ICD-10-CM

## 2023-04-18 LAB
ALBUMIN SERPL-MCNC: 3.8 G/DL (ref 3.5–5)
ALBUMIN/GLOB SERPL: 1 (ref 0.4–1.6)
ALP SERPL-CCNC: 93 U/L (ref 50–136)
ALT SERPL-CCNC: 37 U/L (ref 12–65)
ANION GAP SERPL CALC-SCNC: 6 MMOL/L (ref 2–11)
AST SERPL-CCNC: 20 U/L (ref 15–37)
BASOPHILS # BLD: 0.1 K/UL (ref 0–0.2)
BASOPHILS NFR BLD: 1 % (ref 0–2)
BILIRUB SERPL-MCNC: 0.3 MG/DL (ref 0.2–1.1)
BUN SERPL-MCNC: 13 MG/DL (ref 6–23)
CALCIUM SERPL-MCNC: 10 MG/DL (ref 8.3–10.4)
CHLORIDE SERPL-SCNC: 104 MMOL/L (ref 101–110)
CHOLEST SERPL-MCNC: 225 MG/DL
CO2 SERPL-SCNC: 27 MMOL/L (ref 21–32)
CREAT SERPL-MCNC: 0.8 MG/DL (ref 0.6–1)
DIFFERENTIAL METHOD BLD: ABNORMAL
EOSINOPHIL # BLD: 0.1 K/UL (ref 0–0.8)
EOSINOPHIL NFR BLD: 1 % (ref 0.5–7.8)
ERYTHROCYTE [DISTWIDTH] IN BLOOD BY AUTOMATED COUNT: 14 % (ref 11.9–14.6)
EST. AVERAGE GLUCOSE BLD GHB EST-MCNC: 143 MG/DL
GLOBULIN SER CALC-MCNC: 3.7 G/DL (ref 2.8–4.5)
GLUCOSE SERPL-MCNC: 147 MG/DL (ref 65–100)
HBA1C MFR BLD: 6.6 % (ref 4.8–5.6)
HCT VFR BLD AUTO: 35.1 % (ref 35.8–46.3)
HDLC SERPL-MCNC: 52 MG/DL (ref 40–60)
HDLC SERPL: 4.3
HGB BLD-MCNC: 11.2 G/DL (ref 11.7–15.4)
IMM GRANULOCYTES # BLD AUTO: 0 K/UL (ref 0–0.5)
IMM GRANULOCYTES NFR BLD AUTO: 0 % (ref 0–5)
LDLC SERPL CALC-MCNC: 129.4 MG/DL
LYMPHOCYTES # BLD: 4 K/UL (ref 0.5–4.6)
LYMPHOCYTES NFR BLD: 43 % (ref 13–44)
MCH RBC QN AUTO: 27.9 PG (ref 26.1–32.9)
MCHC RBC AUTO-ENTMCNC: 31.9 G/DL (ref 31.4–35)
MCV RBC AUTO: 87.3 FL (ref 82–102)
MONOCYTES # BLD: 0.4 K/UL (ref 0.1–1.3)
MONOCYTES NFR BLD: 5 % (ref 4–12)
NEUTS SEG # BLD: 4.6 K/UL (ref 1.7–8.2)
NEUTS SEG NFR BLD: 50 % (ref 43–78)
NRBC # BLD: 0 K/UL (ref 0–0.2)
PLATELET # BLD AUTO: 305 K/UL (ref 150–450)
PMV BLD AUTO: 10.6 FL (ref 9.4–12.3)
POTASSIUM SERPL-SCNC: 3.9 MMOL/L (ref 3.5–5.1)
PROT SERPL-MCNC: 7.5 G/DL (ref 6.3–8.2)
RBC # BLD AUTO: 4.02 M/UL (ref 4.05–5.2)
SODIUM SERPL-SCNC: 137 MMOL/L (ref 133–143)
TRIGL SERPL-MCNC: 218 MG/DL (ref 35–150)
VLDLC SERPL CALC-MCNC: 43.6 MG/DL (ref 6–23)
WBC # BLD AUTO: 9.2 K/UL (ref 4.3–11.1)

## 2023-04-18 PROCEDURE — 99214 OFFICE O/P EST MOD 30 MIN: CPT | Performed by: FAMILY MEDICINE

## 2023-04-18 PROCEDURE — 3074F SYST BP LT 130 MM HG: CPT | Performed by: FAMILY MEDICINE

## 2023-04-18 PROCEDURE — 3078F DIAST BP <80 MM HG: CPT | Performed by: FAMILY MEDICINE

## 2023-04-18 RX ORDER — ERENUMAB-AOOE 70 MG/ML
70 INJECTION SUBCUTANEOUS
Qty: 3 ADJUSTABLE DOSE PRE-FILLED PEN SYRINGE | Refills: 1 | Status: SHIPPED | OUTPATIENT
Start: 2023-04-18

## 2023-04-18 RX ORDER — VENLAFAXINE HYDROCHLORIDE 150 MG/1
150 CAPSULE, EXTENDED RELEASE ORAL 2 TIMES DAILY
Qty: 90 CAPSULE | Refills: 1 | Status: SHIPPED | OUTPATIENT
Start: 2023-04-18

## 2023-04-18 RX ORDER — AMITRIPTYLINE HYDROCHLORIDE 25 MG/1
25 TABLET, FILM COATED ORAL NIGHTLY
Qty: 30 TABLET | Refills: 5 | Status: SHIPPED | OUTPATIENT
Start: 2023-04-18

## 2023-04-18 RX ORDER — ESOMEPRAZOLE MAGNESIUM 40 MG/1
40 CAPSULE, DELAYED RELEASE ORAL 2 TIMES DAILY
Qty: 180 CAPSULE | Refills: 1 | Status: SHIPPED | OUTPATIENT
Start: 2023-04-18

## 2023-04-18 RX ORDER — RIZATRIPTAN BENZOATE 10 MG/1
10 TABLET, ORALLY DISINTEGRATING ORAL
Qty: 30 TABLET | Refills: 3 | Status: SHIPPED | OUTPATIENT
Start: 2023-04-18 | End: 2023-04-18

## 2023-04-18 RX ORDER — ONDANSETRON 8 MG/1
8 TABLET, ORALLY DISINTEGRATING ORAL EVERY 8 HOURS PRN
Qty: 30 TABLET | Refills: 1 | Status: SHIPPED | OUTPATIENT
Start: 2023-04-18

## 2023-04-18 RX ORDER — LOSARTAN POTASSIUM AND HYDROCHLOROTHIAZIDE 25; 100 MG/1; MG/1
1 TABLET ORAL DAILY
Qty: 90 TABLET | Refills: 1 | Status: SHIPPED | OUTPATIENT
Start: 2023-04-18

## 2023-04-18 RX ORDER — CLONAZEPAM 0.5 MG/1
0.5 TABLET ORAL DAILY PRN
Status: CANCELLED | OUTPATIENT
Start: 2023-04-18 | End: 2023-05-18

## 2023-04-18 SDOH — ECONOMIC STABILITY: FOOD INSECURITY: WITHIN THE PAST 12 MONTHS, YOU WORRIED THAT YOUR FOOD WOULD RUN OUT BEFORE YOU GOT MONEY TO BUY MORE.: NEVER TRUE

## 2023-04-18 SDOH — ECONOMIC STABILITY: FOOD INSECURITY: WITHIN THE PAST 12 MONTHS, THE FOOD YOU BOUGHT JUST DIDN'T LAST AND YOU DIDN'T HAVE MONEY TO GET MORE.: PATIENT DECLINED

## 2023-04-18 SDOH — ECONOMIC STABILITY: HOUSING INSECURITY
IN THE LAST 12 MONTHS, WAS THERE A TIME WHEN YOU DID NOT HAVE A STEADY PLACE TO SLEEP OR SLEPT IN A SHELTER (INCLUDING NOW)?: NO

## 2023-04-18 SDOH — ECONOMIC STABILITY: INCOME INSECURITY: HOW HARD IS IT FOR YOU TO PAY FOR THE VERY BASICS LIKE FOOD, HOUSING, MEDICAL CARE, AND HEATING?: NOT HARD AT ALL

## 2023-04-18 ASSESSMENT — PATIENT HEALTH QUESTIONNAIRE - PHQ9
SUM OF ALL RESPONSES TO PHQ QUESTIONS 1-9: 0
SUM OF ALL RESPONSES TO PHQ9 QUESTIONS 1 & 2: 0
SUM OF ALL RESPONSES TO PHQ QUESTIONS 1-9: 0
SUM OF ALL RESPONSES TO PHQ QUESTIONS 1-9: 0
2. FEELING DOWN, DEPRESSED OR HOPELESS: 0
1. LITTLE INTEREST OR PLEASURE IN DOING THINGS: 0
SUM OF ALL RESPONSES TO PHQ QUESTIONS 1-9: 0

## 2023-04-18 ASSESSMENT — ENCOUNTER SYMPTOMS
EYE ITCHING: 0
BLURRED VISION: 0
EYE REDNESS: 0
EYES NEGATIVE: 1
EYE DISCHARGE: 0
ORTHOPNEA: 0
PHOTOPHOBIA: 0

## 2023-04-18 NOTE — PROGRESS NOTES
[address] [place] [address]  _______________________________________  Kevin Haley MD                 [address] [place] [address] Drive, [address]. MD Serenity Haji Velasquez [address]                                                                                    Phone: [phone]                                                                                    Fax: [phone]    Mariam Chester is a 37 y.o. female who is seen for evaluation of   Chief Complaint   Patient presents with    Hypertension    Diabetes    Obesity    Insomnia     ongoing       HPI:   Hypertension  This is a chronic problem. Progression since onset: stable on meds, need refills and labs. Associated symptoms include anxiety and malaise/fatigue. Pertinent negatives include no blurred vision, chest pain, orthopnea, palpitations, peripheral edema or PND. Diabetes  She presents for her follow-up diabetic visit. She has type 2 diabetes mellitus. Onset time: stable on meds, need reiflls an dlab. Pertinent negatives for diabetes include no blurred vision and no chest pain. Insomnia  Episode onset: severe problem, klonopin not helping much, chlorazepate not help much, other meds OTC not help. Pertinent negatives include no chest pain or chills. Migraine   Episode onset: still has 2 per moth , meds workign for prevention very well. Associated symptoms include insomnia. Pertinent negatives include no blurred vision, eye redness or photophobia. Chief Complaint   Patient presents with    Hypertension    Diabetes    Obesity    Insomnia     ongoing         Review of Systems:    Review of Systems   Constitutional:  Positive for malaise/fatigue. Negative for activity change, appetite change and chills. Eyes: Negative. Negative for blurred vision, photophobia, discharge, redness and itching. Cardiovascular:  Negative for chest pain, palpitations, orthopnea and PND. Genitourinary: Negative.

## 2023-05-01 ENCOUNTER — OFFICE VISIT (OUTPATIENT)
Dept: OBGYN CLINIC | Age: 43
End: 2023-05-01
Payer: COMMERCIAL

## 2023-05-01 VITALS
DIASTOLIC BLOOD PRESSURE: 80 MMHG | SYSTOLIC BLOOD PRESSURE: 120 MMHG | HEIGHT: 63 IN | WEIGHT: 257 LBS | BODY MASS INDEX: 45.54 KG/M2

## 2023-05-01 DIAGNOSIS — N94.10 DYSPAREUNIA IN FEMALE: ICD-10-CM

## 2023-05-01 DIAGNOSIS — Z12.31 ENCOUNTER FOR SCREENING MAMMOGRAM FOR BREAST CANCER: ICD-10-CM

## 2023-05-01 DIAGNOSIS — L98.9 SKIN LESION OF CHEST WALL: ICD-10-CM

## 2023-05-01 DIAGNOSIS — Z01.419 WELL WOMAN EXAM WITH ROUTINE GYNECOLOGICAL EXAM: Primary | ICD-10-CM

## 2023-05-01 DIAGNOSIS — L98.9 SKIN LESION OF LEFT UPPER EXTREMITY: ICD-10-CM

## 2023-05-01 DIAGNOSIS — N95.2 ATROPHIC VAGINITIS: ICD-10-CM

## 2023-05-01 DIAGNOSIS — E66.01 OBESITY, MORBID (HCC): ICD-10-CM

## 2023-05-01 PROCEDURE — 3078F DIAST BP <80 MM HG: CPT | Performed by: OBSTETRICS & GYNECOLOGY

## 2023-05-01 PROCEDURE — 3074F SYST BP LT 130 MM HG: CPT | Performed by: OBSTETRICS & GYNECOLOGY

## 2023-05-01 PROCEDURE — 99396 PREV VISIT EST AGE 40-64: CPT | Performed by: OBSTETRICS & GYNECOLOGY

## 2023-05-03 RX ORDER — CONJUGATED ESTROGENS 0.62 MG/G
0.5 CREAM VAGINAL DAILY
Qty: 30 G | Refills: 5 | Status: SHIPPED | OUTPATIENT
Start: 2023-05-03

## 2023-06-03 ENCOUNTER — HOSPITAL ENCOUNTER (EMERGENCY)
Age: 43
Discharge: HOME | End: 2023-06-03
Attending: EMERGENCY MEDICINE
Payer: COMMERCIAL

## 2023-06-03 VITALS
OXYGEN SATURATION: 99 % | TEMPERATURE: 98.4 F | WEIGHT: 238 LBS | SYSTOLIC BLOOD PRESSURE: 110 MMHG | DIASTOLIC BLOOD PRESSURE: 80 MMHG | BODY MASS INDEX: 42.17 KG/M2 | HEIGHT: 63 IN | RESPIRATION RATE: 18 BRPM | HEART RATE: 85 BPM

## 2023-06-03 DIAGNOSIS — K92.0 HEMATEMESIS, UNSPECIFIED WHETHER NAUSEA PRESENT: Primary | ICD-10-CM

## 2023-06-03 DIAGNOSIS — K21.9 GASTRO-ESOPHAGEAL REFLUX DISEASE WITHOUT ESOPHAGITIS: ICD-10-CM

## 2023-06-03 LAB
ALBUMIN SERPL-MCNC: 4.9 G/DL (ref 3.5–5)
ALBUMIN/GLOB SERPL: 1.4 (ref 0.4–1.6)
ALP SERPL-CCNC: 102 U/L (ref 45–117)
ALT SERPL-CCNC: 42 U/L (ref 13–61)
ANION GAP SERPL CALC-SCNC: 15 MMOL/L (ref 2–11)
APPEARANCE UR: ABNORMAL
AST SERPL-CCNC: 23 U/L (ref 15–37)
BACTERIA URNS QL MICRO: 0 /HPF
BASOPHILS # BLD: 0.1 K/UL (ref 0–0.2)
BASOPHILS NFR BLD: 1 % (ref 0–2)
BILIRUB SERPL-MCNC: 0.4 MG/DL (ref 0.2–1.1)
BILIRUB UR QL: ABNORMAL
BUN SERPL-MCNC: 12 MG/DL (ref 7–18)
CALCIUM SERPL-MCNC: 10.1 MG/DL (ref 8.3–10.4)
CASTS URNS QL MICRO: 0 /LPF
CHLORIDE SERPL-SCNC: 97 MMOL/L (ref 98–107)
CO2 SERPL-SCNC: 25 MMOL/L (ref 21–32)
COLOR UR: YELLOW
CREAT SERPL-MCNC: 0.82 MG/DL (ref 0.6–1)
CRYSTALS URNS QL MICRO: 0 /LPF
DIFFERENTIAL METHOD BLD: ABNORMAL
EOSINOPHIL # BLD: 0 K/UL (ref 0–0.8)
EOSINOPHIL NFR BLD: 0 % (ref 0.5–7.8)
EPI CELLS #/AREA URNS HPF: ABNORMAL /HPF
ERYTHROCYTE [DISTWIDTH] IN BLOOD BY AUTOMATED COUNT: 14 % (ref 11.9–14.6)
GLOBULIN SER CALC-MCNC: 3.5 G/DL (ref 2.8–4.5)
GLUCOSE SERPL-MCNC: 135 MG/DL (ref 65–100)
GLUCOSE UR STRIP.AUTO-MCNC: NEGATIVE MG/DL
HCT VFR BLD AUTO: 38.2 % (ref 35.8–46.3)
HGB BLD-MCNC: 12.5 G/DL (ref 11.7–15.4)
HGB UR QL STRIP: NEGATIVE
IMM GRANULOCYTES # BLD AUTO: 0.1 K/UL (ref 0–0.5)
IMM GRANULOCYTES NFR BLD AUTO: 1 % (ref 0–5)
KETONES UR QL STRIP.AUTO: ABNORMAL MG/DL
LEUKOCYTE ESTERASE UR QL STRIP.AUTO: NEGATIVE
LIPASE SERPL-CCNC: 35 U/L (ref 13–60)
LYMPHOCYTES # BLD: 3.3 K/UL (ref 0.5–4.6)
LYMPHOCYTES NFR BLD: 25 % (ref 13–44)
MCH RBC QN AUTO: 28.2 PG (ref 26.1–32.9)
MCHC RBC AUTO-ENTMCNC: 32.7 G/DL (ref 31.4–35)
MCV RBC AUTO: 86 FL (ref 82–102)
MONOCYTES # BLD: 0.8 K/UL (ref 0.1–1.3)
MONOCYTES NFR BLD: 6 % (ref 4–12)
MUCOUS THREADS URNS QL MICRO: ABNORMAL /LPF
NEUTS SEG # BLD: 9 K/UL (ref 1.7–8.2)
NEUTS SEG NFR BLD: 68 % (ref 43–78)
NITRITE UR QL STRIP.AUTO: NEGATIVE
NRBC # BLD: 0 K/UL (ref 0–0.2)
OTHER OBSERVATIONS: ABNORMAL
PH UR STRIP: 7 (ref 5–9)
PLATELET # BLD AUTO: 297 K/UL (ref 150–450)
PMV BLD AUTO: 9.9 FL (ref 9.4–12.3)
POTASSIUM SERPL-SCNC: 3.5 MMOL/L (ref 3.5–5.1)
PROT SERPL-MCNC: 8.4 G/DL (ref 6.4–8.2)
PROT UR STRIP-MCNC: >300 MG/DL
RBC # BLD AUTO: 4.44 M/UL (ref 4.05–5.2)
RBC #/AREA URNS HPF: ABNORMAL /HPF
SODIUM SERPL-SCNC: 137 MMOL/L (ref 133–143)
SP GR UR REFRACTOMETRY: 1.02 (ref 1–1.02)
UROBILINOGEN UR QL STRIP.AUTO: 0.2 EU/DL (ref 0.2–1)
WBC # BLD AUTO: 13.3 K/UL (ref 4.3–11.1)
WBC URNS QL MICRO: ABNORMAL /HPF

## 2023-06-03 PROCEDURE — 96374 THER/PROPH/DIAG INJ IV PUSH: CPT

## 2023-06-03 PROCEDURE — 80053 COMPREHEN METABOLIC PANEL: CPT

## 2023-06-03 PROCEDURE — 81001 URINALYSIS AUTO W/SCOPE: CPT

## 2023-06-03 PROCEDURE — 99284 EMERGENCY DEPT VISIT MOD MDM: CPT

## 2023-06-03 PROCEDURE — 96375 TX/PRO/DX INJ NEW DRUG ADDON: CPT

## 2023-06-03 PROCEDURE — C9113 INJ PANTOPRAZOLE SODIUM, VIA: HCPCS | Performed by: EMERGENCY MEDICINE

## 2023-06-03 PROCEDURE — 85025 COMPLETE CBC W/AUTO DIFF WBC: CPT

## 2023-06-03 PROCEDURE — 83690 ASSAY OF LIPASE: CPT

## 2023-06-03 PROCEDURE — A4216 STERILE WATER/SALINE, 10 ML: HCPCS | Performed by: EMERGENCY MEDICINE

## 2023-06-03 PROCEDURE — 6360000002 HC RX W HCPCS: Performed by: EMERGENCY MEDICINE

## 2023-06-03 PROCEDURE — 6370000000 HC RX 637 (ALT 250 FOR IP): Performed by: EMERGENCY MEDICINE

## 2023-06-03 PROCEDURE — 2580000003 HC RX 258: Performed by: EMERGENCY MEDICINE

## 2023-06-03 RX ORDER — ONDANSETRON 2 MG/ML
4 INJECTION INTRAMUSCULAR; INTRAVENOUS
Status: COMPLETED | OUTPATIENT
Start: 2023-06-03 | End: 2023-06-03

## 2023-06-03 RX ORDER — ONDANSETRON 4 MG/1
4 TABLET, FILM COATED ORAL 3 TIMES DAILY PRN
Qty: 15 TABLET | Refills: 0 | Status: SHIPPED | OUTPATIENT
Start: 2023-06-03

## 2023-06-03 RX ORDER — SUCRALFATE 1 G/1
1 TABLET ORAL 4 TIMES DAILY
Qty: 120 TABLET | Refills: 0 | Status: SHIPPED | OUTPATIENT
Start: 2023-06-03

## 2023-06-03 RX ORDER — SUCRALFATE 1 G/1
1 TABLET ORAL
Status: COMPLETED | OUTPATIENT
Start: 2023-06-03 | End: 2023-06-03

## 2023-06-03 RX ADMIN — SUCRALFATE 1 G: 1 TABLET ORAL at 15:08

## 2023-06-03 RX ADMIN — ONDANSETRON 4 MG: 2 INJECTION INTRAMUSCULAR; INTRAVENOUS at 15:09

## 2023-06-03 RX ADMIN — SODIUM CHLORIDE 40 MG: 9 INJECTION, SOLUTION INTRAMUSCULAR; INTRAVENOUS; SUBCUTANEOUS at 15:08

## 2023-06-03 ASSESSMENT — PAIN DESCRIPTION - LOCATION
LOCATION: ABDOMEN
LOCATION: ABDOMEN

## 2023-06-03 ASSESSMENT — PAIN SCALES - GENERAL
PAINLEVEL_OUTOF10: 3

## 2023-06-03 ASSESSMENT — PAIN - FUNCTIONAL ASSESSMENT
PAIN_FUNCTIONAL_ASSESSMENT: 0-10
PAIN_FUNCTIONAL_ASSESSMENT: 0-10

## 2023-06-03 ASSESSMENT — PAIN DESCRIPTION - ORIENTATION: ORIENTATION: MID;UPPER

## 2023-06-03 NOTE — ED TRIAGE NOTES
Pt ambulatory to triage for reports of vomiting. Pt states she has had 3 episodes, last 2 episodes she noticed were dark & had blood clots. Pt reports mid upper abdominal pain x 30 min. Pt also endorsing diarrhea but states normal for her. Pt with hx of reflux, taking nexium.

## 2023-06-03 NOTE — DISCHARGE INSTRUCTIONS
[facility] should [facility] on Monday for an appointment. Use the Carafate to help with pain, Zofran to help with nausea. Return to the emergency department for increased vomiting of red blood, increasing pain or any other concerns.

## 2023-06-03 NOTE — ED PROVIDER NOTES
MG/ML SOAJ    Inject 70 mg into the skin every 30 days    ESOMEPRAZOLE (NEXIUM) 40 MG DELAYED RELEASE CAPSULE    Take 1 capsule by mouth in the morning and at bedtime    ESTROGENS CONJUGATED (PREMARIN) 0.625 MG/GM CREA VAGINAL CREAM    Place 0.5 g vaginally daily    LOSARTAN-HYDROCHLOROTHIAZIDE (HYZAAR) 100-25 MG PER TABLET    Take 1 tablet by mouth daily TAKE 1 TABLET DAILY    METFORMIN (GLUCOPHAGE) 500 MG TABLET    Take 1 tablet by mouth daily (with breakfast)    ONDANSETRON (ZOFRAN-ODT) 8 MG TBDP DISINTEGRATING TABLET    Place 1 tablet under the tongue every 8 hours as needed for Nausea    RIZATRIPTAN (MAXALT-MLT) 10 MG DISINTEGRATING TABLET    Take 1 tablet by mouth once as needed for Migraine May repeat in 2 hours if needed    VENLAFAXINE (EFFEXOR XR) 150 MG EXTENDED RELEASE CAPSULE    Take 1 capsule by mouth 2 times daily    VITAMIN D 25 MCG (1000 UT) CAPS    Take by mouth daily        Results for orders placed or performed during the hospital encounter of 06/03/23   CBC with Diff   Result Value Ref Range    WBC 13.3 (H) 4.3 - 11.1 K/uL    RBC 4.44 4.05 - 5.20 M/uL    Hemoglobin 12.5 11.7 - 15.4 g/dL    Hematocrit 38.2 35.8 - 46.3 %    MCV 86.0 82.0 - 102.0 FL    MCH 28.2 26.1 - 32.9 PG    MCHC 32.7 31.4 - 35.0 g/dL    RDW 14.0 11.9 - 14.6 %    Platelets 875 227 - 681 K/uL    MPV 9.9 9.4 - 12.3 FL    nRBC 0.00 0.0 - 0.2 K/uL    Differential Type AUTOMATED      Neutrophils % 68 43 - 78 %    Lymphocytes % 25 13 - 44 %    Monocytes % 6 4.0 - 12.0 %    Eosinophils % 0 (L) 0.5 - 7.8 %    Basophils % 1 0.0 - 2.0 %    Immature Granulocytes 1 0.0 - 5.0 %    Neutrophils Absolute 9.0 (H) 1.7 - 8.2 K/UL    Lymphocytes Absolute 3.3 0.5 - 4.6 K/UL    Monocytes Absolute 0.8 0.1 - 1.3 K/UL    Eosinophils Absolute 0.0 0.0 - 0.8 K/UL    Basophils Absolute 0.1 0.0 - 0.2 K/UL    Absolute Immature Granulocyte 0.1 0.0 - 0.5 K/UL   CMP   Result Value Ref Range    Sodium 137 133 - 143 mmol/L    Potassium 3.5 3.5 - 5.1 mmol/L

## 2023-06-03 NOTE — ED NOTES
I have reviewed discharge instructions with the patient. The patient verbalized understanding. Patient left ED via Discharge Method: ambulatory to Home with spouse    Opportunity for questions and clarification provided. Patient given 2 scripts. To continue your aftercare when you leave the hospital, you may receive an automated call from our care team to check in on how you are doing. This is a free service and part of our promise to provide the best care and service to meet your aftercare needs.  If you have questions, or wish to unsubscribe from this service please call [phone]. Thank you for Choosing our [facility] Emergency Department.         Peace Cornelius RN  06/03/23 7259

## 2023-08-28 ENCOUNTER — OFFICE VISIT (OUTPATIENT)
Dept: FAMILY MEDICINE CLINIC | Facility: CLINIC | Age: 43
End: 2023-08-28
Payer: COMMERCIAL

## 2023-08-28 VITALS
WEIGHT: 245 LBS | SYSTOLIC BLOOD PRESSURE: 130 MMHG | HEIGHT: 63 IN | DIASTOLIC BLOOD PRESSURE: 70 MMHG | BODY MASS INDEX: 43.41 KG/M2

## 2023-08-28 DIAGNOSIS — G43.C0 PERIODIC HEADACHE SYNDROME, NOT INTRACTABLE: ICD-10-CM

## 2023-08-28 DIAGNOSIS — F51.01 PRIMARY INSOMNIA: ICD-10-CM

## 2023-08-28 DIAGNOSIS — E78.00 PURE HYPERCHOLESTEROLEMIA, UNSPECIFIED: Primary | ICD-10-CM

## 2023-08-28 DIAGNOSIS — I10 ESSENTIAL HYPERTENSION, BENIGN: ICD-10-CM

## 2023-08-28 DIAGNOSIS — E11.9 TYPE 2 DIABETES MELLITUS WITHOUT COMPLICATION, WITHOUT LONG-TERM CURRENT USE OF INSULIN (HCC): ICD-10-CM

## 2023-08-28 DIAGNOSIS — K21.9 GASTRO-ESOPHAGEAL REFLUX DISEASE WITHOUT ESOPHAGITIS: ICD-10-CM

## 2023-08-28 DIAGNOSIS — F41.1 GENERALIZED ANXIETY DISORDER: ICD-10-CM

## 2023-08-28 LAB
BASOPHILS # BLD: 0.1 K/UL (ref 0–0.2)
BASOPHILS NFR BLD: 1 % (ref 0–2)
DIFFERENTIAL METHOD BLD: ABNORMAL
EOSINOPHIL # BLD: 0.1 K/UL (ref 0–0.8)
EOSINOPHIL NFR BLD: 1 % (ref 0.5–7.8)
ERYTHROCYTE [DISTWIDTH] IN BLOOD BY AUTOMATED COUNT: 14.4 % (ref 11.9–14.6)
HCT VFR BLD AUTO: 36.4 % (ref 35.8–46.3)
HGB BLD-MCNC: 11.4 G/DL (ref 11.7–15.4)
IMM GRANULOCYTES # BLD AUTO: 0 K/UL (ref 0–0.5)
IMM GRANULOCYTES NFR BLD AUTO: 0 % (ref 0–5)
LYMPHOCYTES # BLD: 3.6 K/UL (ref 0.5–4.6)
LYMPHOCYTES NFR BLD: 45 % (ref 13–44)
MCH RBC QN AUTO: 27.5 PG (ref 26.1–32.9)
MCHC RBC AUTO-ENTMCNC: 31.3 G/DL (ref 31.4–35)
MCV RBC AUTO: 87.9 FL (ref 82–102)
MONOCYTES # BLD: 0.4 K/UL (ref 0.1–1.3)
MONOCYTES NFR BLD: 5 % (ref 4–12)
NEUTS SEG # BLD: 3.8 K/UL (ref 1.7–8.2)
NEUTS SEG NFR BLD: 48 % (ref 43–78)
NRBC # BLD: 0 K/UL (ref 0–0.2)
PLATELET # BLD AUTO: 323 K/UL (ref 150–450)
PMV BLD AUTO: 10.6 FL (ref 9.4–12.3)
RBC # BLD AUTO: 4.14 M/UL (ref 4.05–5.2)
WBC # BLD AUTO: 8 K/UL (ref 4.3–11.1)

## 2023-08-28 PROCEDURE — 3044F HG A1C LEVEL LT 7.0%: CPT | Performed by: FAMILY MEDICINE

## 2023-08-28 PROCEDURE — 99214 OFFICE O/P EST MOD 30 MIN: CPT | Performed by: FAMILY MEDICINE

## 2023-08-28 PROCEDURE — 3078F DIAST BP <80 MM HG: CPT | Performed by: FAMILY MEDICINE

## 2023-08-28 PROCEDURE — 3075F SYST BP GE 130 - 139MM HG: CPT | Performed by: FAMILY MEDICINE

## 2023-08-28 RX ORDER — ERENUMAB-AOOE 70 MG/ML
70 INJECTION SUBCUTANEOUS
Qty: 3 ADJUSTABLE DOSE PRE-FILLED PEN SYRINGE | Refills: 1 | Status: SHIPPED | OUTPATIENT
Start: 2023-08-28

## 2023-08-28 RX ORDER — AMITRIPTYLINE HYDROCHLORIDE 25 MG/1
25 TABLET, FILM COATED ORAL NIGHTLY
Qty: 90 TABLET | Refills: 1 | Status: SHIPPED | OUTPATIENT
Start: 2023-08-28

## 2023-08-28 RX ORDER — VENLAFAXINE HYDROCHLORIDE 150 MG/1
150 CAPSULE, EXTENDED RELEASE ORAL 2 TIMES DAILY
Qty: 90 CAPSULE | Refills: 1 | Status: SHIPPED | OUTPATIENT
Start: 2023-08-28

## 2023-08-28 RX ORDER — LOSARTAN POTASSIUM AND HYDROCHLOROTHIAZIDE 25; 100 MG/1; MG/1
1 TABLET ORAL DAILY
Qty: 90 TABLET | Refills: 1 | Status: SHIPPED | OUTPATIENT
Start: 2023-08-28

## 2023-08-28 RX ORDER — SUCRALFATE 1 G/1
1 TABLET ORAL 4 TIMES DAILY
Qty: 360 TABLET | Refills: 1 | Status: SHIPPED | OUTPATIENT
Start: 2023-08-28

## 2023-08-28 RX ORDER — ESOMEPRAZOLE MAGNESIUM 40 MG/1
40 CAPSULE, DELAYED RELEASE ORAL 2 TIMES DAILY
Qty: 180 CAPSULE | Refills: 1 | Status: SHIPPED | OUTPATIENT
Start: 2023-08-28

## 2023-08-28 ASSESSMENT — ENCOUNTER SYMPTOMS
ORTHOPNEA: 0
EYE REDNESS: 0
BACK PAIN: 0
EYE DISCHARGE: 0
EYE ITCHING: 0
COUGH: 0
BLURRED VISION: 0
EYE PAIN: 0
BLOOD IN STOOL: 0
APNEA: 0
ABDOMINAL DISTENTION: 0
FACIAL SWELLING: 0
CHEST TIGHTNESS: 0
RHINORRHEA: 0
SINUS PAIN: 0
SINUS PRESSURE: 0
ABDOMINAL PAIN: 0
ANAL BLEEDING: 0

## 2023-08-29 LAB
ALBUMIN SERPL-MCNC: 3.7 G/DL (ref 3.5–5)
ALBUMIN/GLOB SERPL: 1 (ref 0.4–1.6)
ALP SERPL-CCNC: 93 U/L (ref 50–136)
ALT SERPL-CCNC: 39 U/L (ref 12–65)
ANION GAP SERPL CALC-SCNC: 7 MMOL/L (ref 2–11)
AST SERPL-CCNC: 20 U/L (ref 15–37)
BILIRUB SERPL-MCNC: 0.4 MG/DL (ref 0.2–1.1)
BUN SERPL-MCNC: 14 MG/DL (ref 6–23)
CALCIUM SERPL-MCNC: 9.5 MG/DL (ref 8.3–10.4)
CHLORIDE SERPL-SCNC: 103 MMOL/L (ref 101–110)
CHOLEST SERPL-MCNC: 198 MG/DL
CO2 SERPL-SCNC: 29 MMOL/L (ref 21–32)
CREAT SERPL-MCNC: 0.9 MG/DL (ref 0.6–1)
EST. AVERAGE GLUCOSE BLD GHB EST-MCNC: 131 MG/DL
GLOBULIN SER CALC-MCNC: 3.8 G/DL (ref 2.8–4.5)
GLUCOSE SERPL-MCNC: 108 MG/DL (ref 65–100)
HBA1C MFR BLD: 6.2 % (ref 4.8–5.6)
HDLC SERPL-MCNC: 55 MG/DL (ref 40–60)
HDLC SERPL: 3.6
LDLC SERPL CALC-MCNC: 106.4 MG/DL
POTASSIUM SERPL-SCNC: 3.6 MMOL/L (ref 3.5–5.1)
PROT SERPL-MCNC: 7.5 G/DL (ref 6.3–8.2)
SODIUM SERPL-SCNC: 139 MMOL/L (ref 133–143)
TRIGL SERPL-MCNC: 183 MG/DL (ref 35–150)
VLDLC SERPL CALC-MCNC: 36.6 MG/DL (ref 6–23)

## 2023-09-08 ENCOUNTER — TELEPHONE (OUTPATIENT)
Dept: FAMILY MEDICINE CLINIC | Facility: CLINIC | Age: 43
End: 2023-09-08

## 2023-09-08 NOTE — TELEPHONE ENCOUNTER
Meme Kwong Key: [id number] - PA Case ID: [id number] help? Call us at [phone]  Outcome  Approvedtoday  CaseId:[id number];Status:Approved; Review Type:Prior Auth; Coverage Start Date:08/09/2023; Coverage End Date:09/07/2024;  Drug  Aimovig 70MG/ML auto-injectors  Form  [employer] Electronic PA Form ([id number] NCPDP)

## 2024-01-03 ENCOUNTER — OFFICE VISIT (OUTPATIENT)
Dept: FAMILY MEDICINE CLINIC | Facility: CLINIC | Age: 44
End: 2024-01-03
Payer: COMMERCIAL

## 2024-01-03 VITALS
WEIGHT: 237 LBS | DIASTOLIC BLOOD PRESSURE: 62 MMHG | HEIGHT: 63 IN | SYSTOLIC BLOOD PRESSURE: 128 MMHG | BODY MASS INDEX: 41.99 KG/M2

## 2024-01-03 DIAGNOSIS — F51.01 PRIMARY INSOMNIA: ICD-10-CM

## 2024-01-03 DIAGNOSIS — R11.0 NAUSEA: ICD-10-CM

## 2024-01-03 DIAGNOSIS — E78.00 HIGH CHOLESTEROL: ICD-10-CM

## 2024-01-03 DIAGNOSIS — K21.9 GASTRO-ESOPHAGEAL REFLUX DISEASE WITHOUT ESOPHAGITIS: ICD-10-CM

## 2024-01-03 DIAGNOSIS — F32.0 MAJOR DEPRESSIVE DISORDER, SINGLE EPISODE, MILD (HCC): ICD-10-CM

## 2024-01-03 DIAGNOSIS — R53.83 FATIGUE, UNSPECIFIED TYPE: Primary | ICD-10-CM

## 2024-01-03 DIAGNOSIS — E66.01 OBESITY, CLASS III, BMI 40-49.9 (MORBID OBESITY) (HCC): ICD-10-CM

## 2024-01-03 DIAGNOSIS — E11.9 TYPE 2 DIABETES MELLITUS WITHOUT COMPLICATION, WITHOUT LONG-TERM CURRENT USE OF INSULIN (HCC): ICD-10-CM

## 2024-01-03 DIAGNOSIS — E55.9 VITAMIN D DEFICIENCY: ICD-10-CM

## 2024-01-03 DIAGNOSIS — E83.42 HYPOMAGNESEMIA: ICD-10-CM

## 2024-01-03 DIAGNOSIS — F41.1 GENERALIZED ANXIETY DISORDER: ICD-10-CM

## 2024-01-03 DIAGNOSIS — I10 ESSENTIAL HYPERTENSION, BENIGN: ICD-10-CM

## 2024-01-03 DIAGNOSIS — G43.C0 PERIODIC HEADACHE SYNDROME, NOT INTRACTABLE: ICD-10-CM

## 2024-01-03 LAB
ALBUMIN SERPL-MCNC: 3.8 G/DL (ref 3.5–5)
ALBUMIN/GLOB SERPL: 1.1 (ref 0.4–1.6)
ALP SERPL-CCNC: 81 U/L (ref 50–136)
ALT SERPL-CCNC: 32 U/L (ref 12–65)
ANION GAP SERPL CALC-SCNC: 5 MMOL/L (ref 2–11)
AST SERPL-CCNC: 10 U/L (ref 15–37)
BASOPHILS # BLD: 0.1 K/UL (ref 0–0.2)
BASOPHILS NFR BLD: 1 % (ref 0–2)
BILIRUB SERPL-MCNC: 0.4 MG/DL (ref 0.2–1.1)
BUN SERPL-MCNC: 11 MG/DL (ref 6–23)
CALCIUM SERPL-MCNC: 9.6 MG/DL (ref 8.3–10.4)
CHLORIDE SERPL-SCNC: 103 MMOL/L (ref 103–113)
CHOLEST SERPL-MCNC: 213 MG/DL
CO2 SERPL-SCNC: 26 MMOL/L (ref 21–32)
CREAT SERPL-MCNC: 0.8 MG/DL (ref 0.6–1)
DIFFERENTIAL METHOD BLD: ABNORMAL
EOSINOPHIL # BLD: 0.1 K/UL (ref 0–0.8)
EOSINOPHIL NFR BLD: 1 % (ref 0.5–7.8)
ERYTHROCYTE [DISTWIDTH] IN BLOOD BY AUTOMATED COUNT: 14.1 % (ref 11.9–14.6)
GLOBULIN SER CALC-MCNC: 3.4 G/DL (ref 2.8–4.5)
GLUCOSE SERPL-MCNC: 135 MG/DL (ref 65–100)
HCT VFR BLD AUTO: 35.1 % (ref 35.8–46.3)
HDLC SERPL-MCNC: 50 MG/DL (ref 40–60)
HDLC SERPL: 4.3
HGB BLD-MCNC: 11 G/DL (ref 11.7–15.4)
IMM GRANULOCYTES # BLD AUTO: 0.1 K/UL (ref 0–0.5)
IMM GRANULOCYTES NFR BLD AUTO: 1 % (ref 0–5)
LDLC SERPL CALC-MCNC: 122.8 MG/DL
LYMPHOCYTES # BLD: 3.8 K/UL (ref 0.5–4.6)
LYMPHOCYTES NFR BLD: 41 % (ref 13–44)
MAGNESIUM SERPL-MCNC: 2.1 MG/DL (ref 1.8–2.4)
MCH RBC QN AUTO: 27.9 PG (ref 26.1–32.9)
MCHC RBC AUTO-ENTMCNC: 31.3 G/DL (ref 31.4–35)
MCV RBC AUTO: 89.1 FL (ref 82–102)
MONOCYTES # BLD: 0.5 K/UL (ref 0.1–1.3)
MONOCYTES NFR BLD: 6 % (ref 4–12)
NEUTS SEG # BLD: 4.7 K/UL (ref 1.7–8.2)
NEUTS SEG NFR BLD: 50 % (ref 43–78)
NRBC # BLD: 0 K/UL (ref 0–0.2)
PLATELET # BLD AUTO: 285 K/UL (ref 150–450)
PMV BLD AUTO: 10.7 FL (ref 9.4–12.3)
POTASSIUM SERPL-SCNC: 3.6 MMOL/L (ref 3.5–5.1)
PROT SERPL-MCNC: 7.2 G/DL (ref 6.3–8.2)
RBC # BLD AUTO: 3.94 M/UL (ref 4.05–5.2)
SODIUM SERPL-SCNC: 134 MMOL/L (ref 136–146)
TRIGL SERPL-MCNC: 201 MG/DL (ref 35–150)
TSH, 3RD GENERATION: 1.85 UIU/ML (ref 0.36–3.74)
VLDLC SERPL CALC-MCNC: 40.2 MG/DL (ref 6–23)
WBC # BLD AUTO: 9.1 K/UL (ref 4.3–11.1)

## 2024-01-03 PROCEDURE — 3078F DIAST BP <80 MM HG: CPT | Performed by: FAMILY MEDICINE

## 2024-01-03 PROCEDURE — 3074F SYST BP LT 130 MM HG: CPT | Performed by: FAMILY MEDICINE

## 2024-01-03 PROCEDURE — 99214 OFFICE O/P EST MOD 30 MIN: CPT | Performed by: FAMILY MEDICINE

## 2024-01-03 RX ORDER — SEMAGLUTIDE 1.34 MG/ML
INJECTION, SOLUTION SUBCUTANEOUS
Qty: 3 ML | Refills: 12 | OUTPATIENT
Start: 2024-01-03

## 2024-01-03 RX ORDER — SUCRALFATE 1 G/1
1 TABLET ORAL 4 TIMES DAILY
Qty: 360 TABLET | Refills: 1 | Status: SHIPPED | OUTPATIENT
Start: 2024-01-03

## 2024-01-03 RX ORDER — AMITRIPTYLINE HYDROCHLORIDE 25 MG/1
25 TABLET, FILM COATED ORAL NIGHTLY
Qty: 90 TABLET | Refills: 1 | Status: SHIPPED | OUTPATIENT
Start: 2024-01-03

## 2024-01-03 RX ORDER — ONDANSETRON 8 MG/1
8 TABLET, ORALLY DISINTEGRATING ORAL EVERY 8 HOURS PRN
Qty: 30 TABLET | Refills: 1 | Status: SHIPPED | OUTPATIENT
Start: 2024-01-03

## 2024-01-03 RX ORDER — LOSARTAN POTASSIUM AND HYDROCHLOROTHIAZIDE 25; 100 MG/1; MG/1
1 TABLET ORAL DAILY
Qty: 90 TABLET | Refills: 1 | Status: CANCELLED | OUTPATIENT
Start: 2024-01-03

## 2024-01-03 RX ORDER — LOSARTAN POTASSIUM 100 MG/1
100 TABLET ORAL DAILY
Qty: 90 TABLET | Refills: 1 | Status: SHIPPED | OUTPATIENT
Start: 2024-01-03

## 2024-01-03 RX ORDER — ESOMEPRAZOLE MAGNESIUM 40 MG/1
40 CAPSULE, DELAYED RELEASE ORAL 2 TIMES DAILY
Qty: 180 CAPSULE | Refills: 1 | Status: SHIPPED | OUTPATIENT
Start: 2024-01-03

## 2024-01-03 RX ORDER — VENLAFAXINE HYDROCHLORIDE 150 MG/1
150 CAPSULE, EXTENDED RELEASE ORAL 2 TIMES DAILY
Qty: 90 CAPSULE | Refills: 1 | Status: SHIPPED | OUTPATIENT
Start: 2024-01-03

## 2024-01-03 RX ORDER — RIZATRIPTAN BENZOATE 10 MG/1
10 TABLET, ORALLY DISINTEGRATING ORAL DAILY PRN
Qty: 30 TABLET | Refills: 3 | Status: SHIPPED | OUTPATIENT
Start: 2024-01-03

## 2024-01-03 RX ORDER — ERENUMAB-AOOE 70 MG/ML
70 INJECTION SUBCUTANEOUS
Qty: 3 ADJUSTABLE DOSE PRE-FILLED PEN SYRINGE | Refills: 1 | Status: SHIPPED | OUTPATIENT
Start: 2024-01-03

## 2024-01-03 ASSESSMENT — ENCOUNTER SYMPTOMS
ABDOMINAL PAIN: 0
EYE REDNESS: 0
EYE PAIN: 0
APNEA: 0
BLOOD IN STOOL: 0
SINUS PRESSURE: 0
BACK PAIN: 0
COUGH: 0
FACIAL SWELLING: 0
SHORTNESS OF BREATH: 0
EYE ITCHING: 0
EYE DISCHARGE: 0
RHINORRHEA: 0
ORTHOPNEA: 0
ANAL BLEEDING: 0
BLURRED VISION: 0
ABDOMINAL DISTENTION: 0
CHEST TIGHTNESS: 0
SINUS PAIN: 0

## 2024-01-03 ASSESSMENT — PATIENT HEALTH QUESTIONNAIRE - PHQ9
SUM OF ALL RESPONSES TO PHQ QUESTIONS 1-9: 0
SUM OF ALL RESPONSES TO PHQ9 QUESTIONS 1 & 2: 0
SUM OF ALL RESPONSES TO PHQ QUESTIONS 1-9: 0
2. FEELING DOWN, DEPRESSED OR HOPELESS: 0
1. LITTLE INTEREST OR PLEASURE IN DOING THINGS: 0

## 2024-01-03 NOTE — PROGRESS NOTES
[facility]  _______________________________________  Carrington Rodriguez MD                 [address] [place] [address]        Genn[place] [address]                                                                                    Phone: [phone]                                                                                    Fax: [phone]    Neetu Everett is a 43 y.o. female who is seen for evaluation of   Chief Complaint   Patient presents with   • Hypertension   • Asthma   • Depression       HPI:   Hypertension  This is a chronic problem. The current episode started more than 1 year ago. The problem is unchanged. The problem is controlled. Associated symptoms include anxiety and malaise/fatigue. Pertinent negatives include no blurred vision, chest pain, headaches, neck pain, orthopnea, peripheral edema, PND, shortness of breath or sweats. (BP actually a little low with standing at times, now that she has lost weight, feeling better but the dizziness with standing is becoming a problem.   )   Asthma  There is no cough or shortness of breath. Chronicity: stable on meds, mariah refills and labs. Associated symptoms include malaise/fatigue. Pertinent negatives include no appetite change, chest pain, ear pain, fever, headaches, myalgias, PND, rhinorrhea or sweats.   Depression  Visit Type: follow-up  Patient is not experiencing: confusion, nervousness/anxiety and shortness of breath.  Episode frequency: stabale on meds, need reiflls adn labs.      Abnormal Lab  Episode onset: low mag and low VIt D in past, need recheck labs. Pertinent negatives include no abdominal pain, arthralgias, chest pain, chills, congestion, coughing, diaphoresis, fatigue, fever, headaches, joint swelling, myalgias, neck pain or rash.   Gastroesophageal Reflux  She reports no abdominal pain, no chest pain or no coughing. Chronicity: stable on meds, need refills and no breakthrough

## 2024-01-04 LAB
25(OH)D3 SERPL-MCNC: 62.1 NG/ML (ref 30–100)
EST. AVERAGE GLUCOSE BLD GHB EST-MCNC: 105 MG/DL
HBA1C MFR BLD: 5.3 % (ref 4.8–5.6)

## 2024-01-05 DIAGNOSIS — E78.00 HIGH CHOLESTEROL: Primary | ICD-10-CM

## 2024-01-05 RX ORDER — ROSUVASTATIN CALCIUM 10 MG/1
10 TABLET, COATED ORAL DAILY
Qty: 90 TABLET | Refills: 1 | Status: SHIPPED | OUTPATIENT
Start: 2024-01-05

## 2024-01-05 RX ORDER — ROSUVASTATIN CALCIUM 10 MG/1
10 TABLET, COATED ORAL DAILY
Qty: 30 TABLET | Refills: 0 | Status: SHIPPED | OUTPATIENT
Start: 2024-01-05

## 2024-03-25 ENCOUNTER — HOSPITAL ENCOUNTER (OUTPATIENT)
Dept: MAMMOGRAPHY | Age: 44
Discharge: HOME | End: 2024-03-28
Attending: OBSTETRICS & GYNECOLOGY
Payer: COMMERCIAL

## 2024-03-25 VITALS — BODY MASS INDEX: 40.57 KG/M2 | HEIGHT: 63 IN | WEIGHT: 229 LBS

## 2024-03-25 DIAGNOSIS — Z12.31 ENCOUNTER FOR SCREENING MAMMOGRAM FOR BREAST CANCER: ICD-10-CM

## 2024-03-25 PROCEDURE — 77063 BREAST TOMOSYNTHESIS BI: CPT

## 2024-05-13 SDOH — ECONOMIC STABILITY: FOOD INSECURITY: WITHIN THE PAST 12 MONTHS, YOU WORRIED THAT YOUR FOOD WOULD RUN OUT BEFORE YOU GOT MONEY TO BUY MORE.: NEVER TRUE

## 2024-05-13 SDOH — ECONOMIC STABILITY: FOOD INSECURITY: WITHIN THE PAST 12 MONTHS, THE FOOD YOU BOUGHT JUST DIDN'T LAST AND YOU DIDN'T HAVE MONEY TO GET MORE.: NEVER TRUE

## 2024-05-13 SDOH — ECONOMIC STABILITY: INCOME INSECURITY: HOW HARD IS IT FOR YOU TO PAY FOR THE VERY BASICS LIKE FOOD, HOUSING, MEDICAL CARE, AND HEATING?: NOT HARD AT ALL

## 2024-05-13 SDOH — ECONOMIC STABILITY: TRANSPORTATION INSECURITY
IN THE PAST 12 MONTHS, HAS LACK OF TRANSPORTATION KEPT YOU FROM MEETINGS, WORK, OR FROM GETTING THINGS NEEDED FOR DAILY LIVING?: NO

## 2024-05-15 ENCOUNTER — OFFICE VISIT (OUTPATIENT)
Dept: FAMILY MEDICINE CLINIC | Facility: CLINIC | Age: 44
End: 2024-05-15
Payer: COMMERCIAL

## 2024-05-15 VITALS
WEIGHT: 236 LBS | BODY MASS INDEX: 41.82 KG/M2 | SYSTOLIC BLOOD PRESSURE: 126 MMHG | DIASTOLIC BLOOD PRESSURE: 70 MMHG | HEIGHT: 63 IN

## 2024-05-15 DIAGNOSIS — R53.83 FATIGUE, UNSPECIFIED TYPE: ICD-10-CM

## 2024-05-15 DIAGNOSIS — E55.9 VITAMIN D DEFICIENCY: ICD-10-CM

## 2024-05-15 DIAGNOSIS — E11.9 TYPE 2 DIABETES MELLITUS WITHOUT COMPLICATION, WITHOUT LONG-TERM CURRENT USE OF INSULIN (HCC): ICD-10-CM

## 2024-05-15 DIAGNOSIS — I10 ESSENTIAL HYPERTENSION, BENIGN: ICD-10-CM

## 2024-05-15 DIAGNOSIS — E83.42 HYPOMAGNESEMIA: ICD-10-CM

## 2024-05-15 DIAGNOSIS — F51.01 PRIMARY INSOMNIA: ICD-10-CM

## 2024-05-15 DIAGNOSIS — G43.C0 PERIODIC HEADACHE SYNDROME, NOT INTRACTABLE: ICD-10-CM

## 2024-05-15 DIAGNOSIS — K21.9 GASTRO-ESOPHAGEAL REFLUX DISEASE WITHOUT ESOPHAGITIS: ICD-10-CM

## 2024-05-15 DIAGNOSIS — E78.00 HIGH CHOLESTEROL: ICD-10-CM

## 2024-05-15 DIAGNOSIS — K42.9 UMBILICAL HERNIA WITHOUT OBSTRUCTION AND WITHOUT GANGRENE: Primary | ICD-10-CM

## 2024-05-15 LAB
25(OH)D3 SERPL-MCNC: 64.1 NG/ML (ref 30–100)
ALBUMIN SERPL-MCNC: 3.7 G/DL (ref 3.5–5)
ALBUMIN/GLOB SERPL: 1.1 (ref 1–1.9)
ALP SERPL-CCNC: 88 U/L (ref 35–104)
ALT SERPL-CCNC: 13 U/L (ref 12–65)
ANION GAP SERPL CALC-SCNC: 10 MMOL/L (ref 9–18)
AST SERPL-CCNC: 16 U/L (ref 15–37)
BASOPHILS # BLD: 0.1 K/UL (ref 0–0.2)
BASOPHILS NFR BLD: 1 % (ref 0–2)
BILIRUB SERPL-MCNC: <0.2 MG/DL (ref 0–1.2)
BUN SERPL-MCNC: 10 MG/DL (ref 6–23)
CALCIUM SERPL-MCNC: 9.4 MG/DL (ref 8.8–10.2)
CHLORIDE SERPL-SCNC: 102 MMOL/L (ref 98–107)
CHOLEST SERPL-MCNC: 225 MG/DL (ref 0–200)
CO2 SERPL-SCNC: 25 MMOL/L (ref 20–28)
CREAT SERPL-MCNC: 0.71 MG/DL (ref 0.6–1.1)
DIFFERENTIAL METHOD BLD: ABNORMAL
EOSINOPHIL # BLD: 0.1 K/UL (ref 0–0.8)
EOSINOPHIL NFR BLD: 1 % (ref 0.5–7.8)
ERYTHROCYTE [DISTWIDTH] IN BLOOD BY AUTOMATED COUNT: 14.5 % (ref 11.9–14.6)
EST. AVERAGE GLUCOSE BLD GHB EST-MCNC: 114 MG/DL
GLOBULIN SER CALC-MCNC: 3.3 G/DL (ref 2.3–3.5)
GLUCOSE SERPL-MCNC: 101 MG/DL (ref 70–99)
HBA1C MFR BLD: 5.6 % (ref 0–5.6)
HCT VFR BLD AUTO: 37.1 % (ref 35.8–46.3)
HDLC SERPL-MCNC: 55 MG/DL (ref 40–60)
HDLC SERPL: 4.1 (ref 0–5)
HGB BLD-MCNC: 11.6 G/DL (ref 11.7–15.4)
IMM GRANULOCYTES # BLD AUTO: 0 K/UL (ref 0–0.5)
IMM GRANULOCYTES NFR BLD AUTO: 0 % (ref 0–5)
LDLC SERPL CALC-MCNC: 144 MG/DL (ref 0–100)
LYMPHOCYTES # BLD: 4.4 K/UL (ref 0.5–4.6)
LYMPHOCYTES NFR BLD: 50 % (ref 13–44)
MAGNESIUM SERPL-MCNC: 1.9 MG/DL (ref 1.8–2.4)
MCH RBC QN AUTO: 27.9 PG (ref 26.1–32.9)
MCHC RBC AUTO-ENTMCNC: 31.3 G/DL (ref 31.4–35)
MCV RBC AUTO: 89.2 FL (ref 82–102)
MONOCYTES # BLD: 0.5 K/UL (ref 0.1–1.3)
MONOCYTES NFR BLD: 5 % (ref 4–12)
NEUTS SEG # BLD: 3.9 K/UL (ref 1.7–8.2)
NEUTS SEG NFR BLD: 43 % (ref 43–78)
NRBC # BLD: 0 K/UL (ref 0–0.2)
PLATELET # BLD AUTO: 268 K/UL (ref 150–450)
PMV BLD AUTO: 10.2 FL (ref 9.4–12.3)
POTASSIUM SERPL-SCNC: 4.6 MMOL/L (ref 3.5–5.1)
PROT SERPL-MCNC: 7 G/DL (ref 6.3–8.2)
RBC # BLD AUTO: 4.16 M/UL (ref 4.05–5.2)
SODIUM SERPL-SCNC: 138 MMOL/L (ref 136–145)
TRIGL SERPL-MCNC: 134 MG/DL (ref 0–150)
TSH, 3RD GENERATION: 1.9 UIU/ML (ref 0.27–4.2)
VLDLC SERPL CALC-MCNC: 27 MG/DL (ref 6–23)
WBC # BLD AUTO: 9 K/UL (ref 4.3–11.1)

## 2024-05-15 PROCEDURE — 3044F HG A1C LEVEL LT 7.0%: CPT | Performed by: FAMILY MEDICINE

## 2024-05-15 PROCEDURE — 3078F DIAST BP <80 MM HG: CPT | Performed by: FAMILY MEDICINE

## 2024-05-15 PROCEDURE — 99214 OFFICE O/P EST MOD 30 MIN: CPT | Performed by: FAMILY MEDICINE

## 2024-05-15 PROCEDURE — 3074F SYST BP LT 130 MM HG: CPT | Performed by: FAMILY MEDICINE

## 2024-05-15 ASSESSMENT — ENCOUNTER SYMPTOMS
APNEA: 0
FACIAL SWELLING: 0
EYE PAIN: 0
ABDOMINAL DISTENTION: 0
EYE REDNESS: 0
ABDOMINAL PAIN: 1
BLOOD IN STOOL: 0
EYE DISCHARGE: 0
EYE ITCHING: 0
RHINORRHEA: 0
COUGH: 0
SINUS PRESSURE: 0
CHEST TIGHTNESS: 0
BACK PAIN: 0
ANAL BLEEDING: 0
BLURRED VISION: 0

## 2024-05-15 NOTE — PROGRESS NOTES
• Deviated nasal septum    • Endometriosis    • Essential hypertension, benign     controlled with meds   • Fibrocystic breast    • GERD (gastroesophageal reflux disease)     medication, well controlled   • Headache    • High blood pressure    • Hypertrophy of nasal turbinates    • Other ill-defined conditions(799.89)     current sinus infection of 10 week duration   • Ovarian cyst    • PCOS (polycystic ovarian syndrome)    • Pelvic inflammation in female    • Pneumonia 2002    RLL, hospitalized   • PONV (postoperative nausea and vomiting)    • Postoperative urinary retention     multiple times, has required murry for several days   • Preeclampsia    • Sinus pain    • Thyroid disease     reports small nodule - no medication or surgery   • URI, acute    • UTI (urinary tract infection)        Past Surgical History:   Procedure Laterality Date   • CARPAL TUNNEL RELEASE Bilateral    • CHOLECYSTECTOMY     • COLONOSCOPY      normal no polyps, Dr Bullock   • HEMORRHOID SURGERY     • HYSTERECTOMY (CERVIX STATUS UNKNOWN)     • KNEE ARTHROSCOPY Left 11/16/2022    LEFT KNEE ARTHROSCOPY MEDIAL MENISCECTOMY performed by Roger Ledesma MD at [facility]   • PELVIC LAPAROSCOPY     • SEPTOPLASTY  2015    septoplasty w/ bone spur removal, turbinates shaved; Dr. Flores @ [facility]   • SINUS SURGERY  2009    SMRITs, Balloon Sinuplasty       Family History   Problem Relation Age of Onset   • Colon Cancer Mother    • Cancer Mother         pancreatic cancer   • High Cholesterol Mother    • Heart Disease Father    • Breast Cancer Maternal Aunt         50s   • Breast Cancer Other 35        mat cousin   • Lung Disease Neg Hx        Social History     Tobacco Use   • Smoking status: Never   • Smokeless tobacco: Never   Substance Use Topics   • Alcohol use: No       Allergies   Allergen Reactions   • Leuprolide Anaphylaxis and Swelling     Difficulty breathing.   • Hydromorphone Itching   • Sulfa Antibiotics Other (See Comments) and Rash     As

## 2024-05-21 ENCOUNTER — OFFICE VISIT (OUTPATIENT)
Age: 44
End: 2024-05-21
Payer: COMMERCIAL

## 2024-05-21 VITALS
SYSTOLIC BLOOD PRESSURE: 124 MMHG | HEIGHT: 63 IN | DIASTOLIC BLOOD PRESSURE: 88 MMHG | WEIGHT: 239 LBS | BODY MASS INDEX: 42.35 KG/M2

## 2024-05-21 DIAGNOSIS — K42.9 UMBILICAL HERNIA WITHOUT OBSTRUCTION AND WITHOUT GANGRENE: Primary | ICD-10-CM

## 2024-05-21 PROCEDURE — 99202 OFFICE O/P NEW SF 15 MIN: CPT | Performed by: SURGERY

## 2024-05-21 PROCEDURE — 3079F DIAST BP 80-89 MM HG: CPT | Performed by: SURGERY

## 2024-05-21 PROCEDURE — 3074F SYST BP LT 130 MM HG: CPT | Performed by: SURGERY

## 2024-05-21 NOTE — PROGRESS NOTES
continuing to try to lose weight and exercise.  All questions were answered.  She agrees with this plan.  I will see her at any time should anything change.          TOSHA HUYNH JR, MD,  FACS

## 2024-06-28 ENCOUNTER — OFFICE VISIT (OUTPATIENT)
Dept: OBGYN CLINIC | Age: 44
End: 2024-06-28
Payer: COMMERCIAL

## 2024-06-28 VITALS
HEIGHT: 63 IN | SYSTOLIC BLOOD PRESSURE: 124 MMHG | BODY MASS INDEX: 43.59 KG/M2 | WEIGHT: 246 LBS | DIASTOLIC BLOOD PRESSURE: 76 MMHG

## 2024-06-28 DIAGNOSIS — Z12.31 ENCOUNTER FOR SCREENING MAMMOGRAM FOR MALIGNANT NEOPLASM OF BREAST: Primary | ICD-10-CM

## 2024-06-28 PROCEDURE — 99396 PREV VISIT EST AGE 40-64: CPT | Performed by: OBSTETRICS & GYNECOLOGY

## 2024-06-28 PROCEDURE — 3074F SYST BP LT 130 MM HG: CPT | Performed by: OBSTETRICS & GYNECOLOGY

## 2024-06-28 PROCEDURE — 3078F DIAST BP <80 MM HG: CPT | Performed by: OBSTETRICS & GYNECOLOGY

## 2024-06-28 RX ORDER — LINACLOTIDE 72 UG/1
145 CAPSULE, GELATIN COATED ORAL
COMMUNITY
Start: 2024-05-20

## 2024-06-28 NOTE — PROGRESS NOTES
Patient presents today for   Chief Complaint   Patient presents with    Annual Exam       LMP: No LMP recorded. Patient has had a hysterectomy.  Contraception: status post hysterectomy        Allergies   Allergen Reactions    Leuprolide Anaphylaxis and Swelling     Difficulty breathing.    Hydromorphone Itching    Sulfa Antibiotics Other (See Comments) and Rash     As a child     Current Outpatient Medications   Medication Sig Dispense Refill    LINZESS 72 MCG CAPS capsule Take 145 mcg by mouth every morning (before breakfast)      rosuvastatin (CRESTOR) 10 MG tablet Take 1 tablet by mouth daily 30 tablet 0    amitriptyline (ELAVIL) 25 MG tablet Take 1 tablet by mouth nightly 90 tablet 1    Erenumab-aooe (AIMOVIG) 70 MG/ML SOAJ Inject 70 mg into the skin every 30 days 3 Adjustable Dose Pre-filled Pen Syringe 1    esomeprazole (NEXIUM) 40 MG delayed release capsule Take 1 capsule by mouth in the morning and at bedtime 180 capsule 1    metFORMIN (GLUCOPHAGE) 500 MG tablet Take 1 tablet by mouth daily (with breakfast) 180 tablet 1    ondansetron (ZOFRAN-ODT) 8 MG TBDP disintegrating tablet Place 1 tablet under the tongue every 8 hours as needed for Nausea 30 tablet 1    rizatriptan (MAXALT-MLT) 10 MG disintegrating tablet Take 1 tablet by mouth daily as needed for Migraine May repeat in 2 hours if needed 30 tablet 3    Semaglutide, 2 MG/DOSE, 8 MG/3ML SOPN Inject 2 mg into the skin once a week 9 mL 1    venlafaxine (EFFEXOR XR) 150 MG extended release capsule Take 1 capsule by mouth 2 times daily 90 capsule 1    losartan (COZAAR) 100 MG tablet Take 1 tablet by mouth daily 90 tablet 1    cetirizine (ZYRTEC) 10 MG tablet Take 2 tablets by mouth daily      vitamin D 25 MCG (1000 UT) CAPS Take by mouth daily      rosuvastatin (CRESTOR) 10 MG tablet Take 1 tablet by mouth daily 90 tablet 1    sucralfate (CARAFATE) 1 GM tablet Take 1 tablet by mouth 4 times daily 360 tablet 1    ondansetron (ZOFRAN) 4 MG tablet Take 1 tablet

## 2024-07-17 DIAGNOSIS — E78.00 HIGH CHOLESTEROL: ICD-10-CM

## 2024-07-17 DIAGNOSIS — I10 ESSENTIAL HYPERTENSION, BENIGN: ICD-10-CM

## 2024-07-17 DIAGNOSIS — E11.9 TYPE 2 DIABETES MELLITUS WITHOUT COMPLICATION, WITHOUT LONG-TERM CURRENT USE OF INSULIN (HCC): ICD-10-CM

## 2024-07-17 DIAGNOSIS — F41.1 GENERALIZED ANXIETY DISORDER: ICD-10-CM

## 2024-07-17 RX ORDER — VENLAFAXINE HYDROCHLORIDE 150 MG/1
150 CAPSULE, EXTENDED RELEASE ORAL 2 TIMES DAILY
Qty: 180 CAPSULE | Refills: 1 | Status: SHIPPED | OUTPATIENT
Start: 2024-07-17 | End: 2025-01-13

## 2024-07-17 RX ORDER — ROSUVASTATIN CALCIUM 10 MG/1
10 TABLET, COATED ORAL DAILY
Qty: 90 TABLET | Refills: 1 | Status: SHIPPED | OUTPATIENT
Start: 2024-07-17 | End: 2025-01-13

## 2024-07-17 RX ORDER — SEMAGLUTIDE 2.68 MG/ML
2 INJECTION, SOLUTION SUBCUTANEOUS
Qty: 9 ML | Refills: 1 | Status: SHIPPED | OUTPATIENT
Start: 2024-07-17 | End: 2025-01-01

## 2024-07-17 RX ORDER — LOSARTAN POTASSIUM 100 MG/1
100 TABLET ORAL DAILY
Qty: 90 TABLET | Refills: 1 | Status: SHIPPED | OUTPATIENT
Start: 2024-07-17 | End: 2025-01-13

## 2024-07-17 NOTE — TELEPHONE ENCOUNTER
requesting refill(s) on     Requested Prescriptions     Pending Prescriptions Disp Refills    venlafaxine (EFFEXOR XR) 150 MG extended release capsule [Pharmacy Med Name: VENLAFAXINE HCL ER CAPS 150MG] 180 capsule 1     Sig: Take 1 capsule by mouth 2 times daily    semaglutide, 2 MG/DOSE, (OZEMPIC, 2 MG/DOSE,) 8 MG/3ML SOPN sc injection [Pharmacy Med Name: OZEMPIC PEN (2MG/DOSE) 3ML 8MG/3ML] 9 mL 1     Sig: Inject 2 mg into the skin every 7 days    losartan (COZAAR) 100 MG tablet [Pharmacy Med Name: LOSARTAN TABS 100MG] 90 tablet 1     Sig: Take 1 tablet by mouth daily    rosuvastatin (CRESTOR) 10 MG tablet [Pharmacy Med Name: ROSUVASTATIN TABS 10MG] 90 tablet 1     Sig: Take 1 tablet by mouth daily       Last appointment- 5/15/20  Next appointment-  9/20/24

## 2024-08-16 ENCOUNTER — TELEMEDICINE (OUTPATIENT)
Dept: FAMILY MEDICINE CLINIC | Facility: CLINIC | Age: 44
End: 2024-08-16
Payer: COMMERCIAL

## 2024-08-16 DIAGNOSIS — U07.1 COVID-19: Primary | ICD-10-CM

## 2024-08-16 PROCEDURE — 99213 OFFICE O/P EST LOW 20 MIN: CPT | Performed by: FAMILY MEDICINE

## 2024-08-16 RX ORDER — BROMPHENIRAMINE MALEATE, PSEUDOEPHEDRINE HYDROCHLORIDE, AND DEXTROMETHORPHAN HYDROBROMIDE 2; 30; 10 MG/5ML; MG/5ML; MG/5ML
5 SYRUP ORAL 4 TIMES DAILY PRN
Qty: 120 ML | Refills: 0 | Status: SHIPPED | OUTPATIENT
Start: 2024-08-16

## 2024-08-17 NOTE — PROGRESS NOTES
Call if problems get worse or do not resolve in the next few days.    Neetu Everett, was evaluated through a synchronous (real-time) audio-video encounter. The patient (or guardian if applicable) is aware that this is a billable service, which includes applicable co-pays. This Virtual Visit was conducted with patient's (and/or legal guardian's) consent. Patient identification was verified, and a caregiver was present when appropriate.   The patient was located at Home: [address] [place] [id number]  Provider was located at Facility (Appt Dept): [address] [place] [id number]  Confirm you are appropriately licensed, registered, or certified to deliver care in the [facility] where the patient is located as indicated above. If you are not or unsure, please re-schedule the visit: Yes, I confirm.      Total time spent for this encounter: Not billed by time    --Carrington Eli MD on 8/16/2024 at 10:58 PM    An electronic signature was used to authenticate this note.    Carrington Eli MD

## 2024-11-06 ENCOUNTER — OFFICE VISIT (OUTPATIENT)
Dept: FAMILY MEDICINE CLINIC | Facility: CLINIC | Age: 44
End: 2024-11-06
Payer: COMMERCIAL

## 2024-11-06 VITALS
HEIGHT: 63 IN | DIASTOLIC BLOOD PRESSURE: 76 MMHG | WEIGHT: 256 LBS | SYSTOLIC BLOOD PRESSURE: 120 MMHG | BODY MASS INDEX: 45.36 KG/M2

## 2024-11-06 DIAGNOSIS — F41.1 GENERALIZED ANXIETY DISORDER: ICD-10-CM

## 2024-11-06 DIAGNOSIS — J45.41 MODERATE PERSISTENT ASTHMA WITH ACUTE EXACERBATION: ICD-10-CM

## 2024-11-06 DIAGNOSIS — R11.0 NAUSEA: ICD-10-CM

## 2024-11-06 DIAGNOSIS — E11.9 TYPE 2 DIABETES MELLITUS WITHOUT COMPLICATION, WITHOUT LONG-TERM CURRENT USE OF INSULIN (HCC): ICD-10-CM

## 2024-11-06 DIAGNOSIS — K21.9 GASTRO-ESOPHAGEAL REFLUX DISEASE WITHOUT ESOPHAGITIS: ICD-10-CM

## 2024-11-06 DIAGNOSIS — J20.9 ACUTE BRONCHITIS, UNSPECIFIED ORGANISM: Primary | ICD-10-CM

## 2024-11-06 DIAGNOSIS — G43.C0 PERIODIC HEADACHE SYNDROME, NOT INTRACTABLE: ICD-10-CM

## 2024-11-06 DIAGNOSIS — E78.00 HIGH CHOLESTEROL: ICD-10-CM

## 2024-11-06 DIAGNOSIS — I10 ESSENTIAL HYPERTENSION, BENIGN: ICD-10-CM

## 2024-11-06 LAB
ALBUMIN SERPL-MCNC: 3.3 G/DL (ref 3.5–5)
ALBUMIN/GLOB SERPL: 0.8 (ref 1–1.9)
ALP SERPL-CCNC: 89 U/L (ref 35–104)
ALT SERPL-CCNC: 14 U/L (ref 8–45)
ANION GAP SERPL CALC-SCNC: 12 MMOL/L (ref 7–16)
AST SERPL-CCNC: 13 U/L (ref 15–37)
BASOPHILS # BLD: 0.1 K/UL (ref 0–0.2)
BASOPHILS NFR BLD: 1 % (ref 0–2)
BILIRUB SERPL-MCNC: 0.4 MG/DL (ref 0–1.2)
BUN SERPL-MCNC: 9 MG/DL (ref 6–23)
CALCIUM SERPL-MCNC: 9.6 MG/DL (ref 8.8–10.2)
CHLORIDE SERPL-SCNC: 97 MMOL/L (ref 98–107)
CHOLEST SERPL-MCNC: 127 MG/DL (ref 0–200)
CO2 SERPL-SCNC: 28 MMOL/L (ref 20–29)
CREAT SERPL-MCNC: 0.8 MG/DL (ref 0.6–1.1)
DIFFERENTIAL METHOD BLD: ABNORMAL
EOSINOPHIL # BLD: 0.1 K/UL (ref 0–0.8)
EOSINOPHIL NFR BLD: 1 % (ref 0.5–7.8)
ERYTHROCYTE [DISTWIDTH] IN BLOOD BY AUTOMATED COUNT: 14.5 % (ref 11.9–14.6)
EST. AVERAGE GLUCOSE BLD GHB EST-MCNC: 136 MG/DL
GLOBULIN SER CALC-MCNC: 4 G/DL (ref 2.3–3.5)
GLUCOSE SERPL-MCNC: 107 MG/DL (ref 70–99)
HBA1C MFR BLD: 6.4 % (ref 0–5.6)
HCT VFR BLD AUTO: 32.9 % (ref 35.8–46.3)
HDLC SERPL-MCNC: 56 MG/DL (ref 40–60)
HDLC SERPL: 2.3 (ref 0–5)
HGB BLD-MCNC: 10.3 G/DL (ref 11.7–15.4)
IMM GRANULOCYTES # BLD AUTO: 0.1 K/UL (ref 0–0.5)
IMM GRANULOCYTES NFR BLD AUTO: 1 % (ref 0–5)
LDLC SERPL CALC-MCNC: 24 MG/DL (ref 0–100)
LYMPHOCYTES # BLD: 3.8 K/UL (ref 0.5–4.6)
LYMPHOCYTES NFR BLD: 34 % (ref 13–44)
MCH RBC QN AUTO: 27.7 PG (ref 26.1–32.9)
MCHC RBC AUTO-ENTMCNC: 31.3 G/DL (ref 31.4–35)
MCV RBC AUTO: 88.4 FL (ref 82–102)
MONOCYTES # BLD: 0.7 K/UL (ref 0.1–1.3)
MONOCYTES NFR BLD: 6 % (ref 4–12)
NEUTS SEG # BLD: 6.2 K/UL (ref 1.7–8.2)
NEUTS SEG NFR BLD: 57 % (ref 43–78)
NRBC # BLD: 0 K/UL (ref 0–0.2)
PLATELET # BLD AUTO: 281 K/UL (ref 150–450)
PMV BLD AUTO: 9.9 FL (ref 9.4–12.3)
POTASSIUM SERPL-SCNC: 3.3 MMOL/L (ref 3.5–5.1)
PROT SERPL-MCNC: 7.3 G/DL (ref 6.3–8.2)
RBC # BLD AUTO: 3.72 M/UL (ref 4.05–5.2)
SODIUM SERPL-SCNC: 137 MMOL/L (ref 136–145)
TRIGL SERPL-MCNC: 237 MG/DL (ref 0–150)
VLDLC SERPL CALC-MCNC: 47 MG/DL (ref 6–23)
WBC # BLD AUTO: 10.9 K/UL (ref 4.3–11.1)

## 2024-11-06 PROCEDURE — 3074F SYST BP LT 130 MM HG: CPT | Performed by: FAMILY MEDICINE

## 2024-11-06 PROCEDURE — 99214 OFFICE O/P EST MOD 30 MIN: CPT | Performed by: FAMILY MEDICINE

## 2024-11-06 PROCEDURE — 3044F HG A1C LEVEL LT 7.0%: CPT | Performed by: FAMILY MEDICINE

## 2024-11-06 PROCEDURE — 3078F DIAST BP <80 MM HG: CPT | Performed by: FAMILY MEDICINE

## 2024-11-06 RX ORDER — ONDANSETRON 8 MG/1
8 TABLET, ORALLY DISINTEGRATING ORAL EVERY 8 HOURS PRN
Qty: 30 TABLET | Refills: 1 | Status: SHIPPED | OUTPATIENT
Start: 2024-11-06

## 2024-11-06 RX ORDER — AZITHROMYCIN 250 MG/1
TABLET, FILM COATED ORAL
Qty: 6 TABLET | Refills: 0 | Status: SHIPPED | OUTPATIENT
Start: 2024-11-06 | End: 2024-11-16

## 2024-11-06 RX ORDER — VENLAFAXINE HYDROCHLORIDE 150 MG/1
150 CAPSULE, EXTENDED RELEASE ORAL 2 TIMES DAILY
Qty: 180 CAPSULE | Refills: 1 | Status: SHIPPED | OUTPATIENT
Start: 2024-11-06 | End: 2025-05-05

## 2024-11-06 RX ORDER — BUDESONIDE AND FORMOTEROL FUMARATE DIHYDRATE 160; 4.5 UG/1; UG/1
2 AEROSOL RESPIRATORY (INHALATION) 2 TIMES DAILY
Qty: 3 EACH | Refills: 3 | Status: SHIPPED | OUTPATIENT
Start: 2024-11-06

## 2024-11-06 RX ORDER — LOSARTAN POTASSIUM 100 MG/1
100 TABLET ORAL DAILY
Qty: 90 TABLET | Refills: 1 | Status: SHIPPED | OUTPATIENT
Start: 2024-11-06 | End: 2025-05-05

## 2024-11-06 RX ORDER — ALBUTEROL SULFATE 90 UG/1
2 INHALANT RESPIRATORY (INHALATION) 4 TIMES DAILY PRN
Qty: 18 G | Refills: 0 | Status: SHIPPED | OUTPATIENT
Start: 2024-11-06

## 2024-11-06 RX ORDER — ERENUMAB-AOOE 70 MG/ML
70 INJECTION SUBCUTANEOUS
Qty: 3 ADJUSTABLE DOSE PRE-FILLED PEN SYRINGE | Refills: 1 | Status: SHIPPED | OUTPATIENT
Start: 2024-11-06

## 2024-11-06 RX ORDER — ALBUTEROL SULFATE 0.83 MG/ML
2.5 SOLUTION RESPIRATORY (INHALATION) 4 TIMES DAILY PRN
Qty: 120 EACH | Refills: 3 | Status: SHIPPED | OUTPATIENT
Start: 2024-11-06

## 2024-11-06 RX ORDER — PREDNISONE 20 MG/1
20 TABLET ORAL 2 TIMES DAILY
Qty: 10 TABLET | Refills: 0 | Status: SHIPPED | OUTPATIENT
Start: 2024-11-06 | End: 2024-11-11

## 2024-11-06 RX ORDER — ROSUVASTATIN CALCIUM 10 MG/1
10 TABLET, COATED ORAL DAILY
Qty: 90 TABLET | Refills: 1 | Status: SHIPPED | OUTPATIENT
Start: 2024-11-06 | End: 2025-05-05

## 2024-11-06 RX ORDER — ESOMEPRAZOLE MAGNESIUM 40 MG/1
40 CAPSULE, DELAYED RELEASE ORAL 2 TIMES DAILY
Qty: 180 CAPSULE | Refills: 1 | Status: SHIPPED | OUTPATIENT
Start: 2024-11-06

## 2024-11-06 RX ORDER — SEMAGLUTIDE 2.68 MG/ML
2 INJECTION, SOLUTION SUBCUTANEOUS
Qty: 9 ML | Refills: 1 | Status: SHIPPED | OUTPATIENT
Start: 2024-11-06 | End: 2025-04-23

## 2024-11-06 ASSESSMENT — ENCOUNTER SYMPTOMS
RHINORRHEA: 0
EYE PAIN: 0
COUGH: 0
EYE REDNESS: 0
APNEA: 0
ANAL BLEEDING: 0
BLOOD IN STOOL: 0
EYE ITCHING: 0
ABDOMINAL PAIN: 0
ABDOMINAL DISTENTION: 0
FACIAL SWELLING: 0
SINUS PAIN: 0
CHEST TIGHTNESS: 0
EYE DISCHARGE: 0
BACK PAIN: 0
SINUS PRESSURE: 0

## 2024-11-06 NOTE — PROGRESS NOTES
[facility]  _______________________________________  Carrington Rodriguez MD                 [address] [place] [address]        Gennaro Haji MD                     [place] [address]                                                                                    Phone: [phone]                                                                                    Fax: [phone]    Neetu Everett is a 44 y.o. female who is seen for evaluation of No chief complaint on file.      HPI:   Asthma  There is no cough. Chronicity: normally well controlled, need recheck labs and reiflls, has a current flare ongoing last 7-9 days, see below. Pertinent negatives include no appetite change, chest pain, ear pain, fever, headaches, myalgias or rhinorrhea.   Gastroesophageal Reflux  She reports no abdominal pain, no chest pain or no coughing. Chronicity: on meds, need recheck labs. Associated symptoms include fatigue.   Hyperlipidemia  This is a chronic problem. Episode onset: on meds, need refills and labs. Pertinent negatives include no chest pain or myalgias.   Weight Management  Episode onset: chronic obesity, need recheck labs, is still on ozempic and diet efforts. Associated symptoms include fatigue. Pertinent negatives include no abdominal pain, arthralgias, chest pain, chills, congestion, coughing, diaphoresis, fever, headaches, joint swelling, myalgias or rash.   Depression  Visit Type: follow-up  Patient is not experiencing: confusion and nervousness/anxiety.  Episode frequency: condition satble on meds, no side effect noted.      Diabetes  She presents for her follow-up diabetic visit. She has type 2 diabetes mellitus. Disease course: stable on meds, no high readings noted. Pertinent negatives for hypoglycemia include no confusion, dizziness, headaches, nervousness/anxiousness or pallor. Associated symptoms include fatigue. Pertinent negatives for diabetes include no chest pain.   Migraine

## 2024-11-14 ENCOUNTER — PATIENT MESSAGE (OUTPATIENT)
Dept: FAMILY MEDICINE CLINIC | Facility: CLINIC | Age: 44
End: 2024-11-14

## 2024-11-14 DIAGNOSIS — J20.9 ACUTE BRONCHITIS, UNSPECIFIED ORGANISM: ICD-10-CM

## 2024-11-14 DIAGNOSIS — J45.41 MODERATE PERSISTENT ASTHMA WITH ACUTE EXACERBATION: ICD-10-CM

## 2024-11-14 RX ORDER — ALBUTEROL SULFATE 90 UG/1
2 INHALANT RESPIRATORY (INHALATION) 4 TIMES DAILY PRN
Qty: 18 G | Refills: 2 | Status: SHIPPED | OUTPATIENT
Start: 2024-11-14

## 2024-11-14 RX ORDER — AZITHROMYCIN 250 MG/1
TABLET, FILM COATED ORAL
Qty: 6 TABLET | Refills: 0 | Status: SHIPPED | OUTPATIENT
Start: 2024-11-14 | End: 2024-11-24

## 2024-11-14 RX ORDER — FLUCONAZOLE 150 MG/1
150 TABLET ORAL ONCE
Qty: 1 TABLET | Refills: 0 | Status: SHIPPED | OUTPATIENT
Start: 2024-11-14 | End: 2024-11-14

## (undated) DEVICE — 1010 S-DRAPE TOWEL DRAPE 10/BX: Brand: STERI-DRAPE™

## (undated) DEVICE — BANDAGE COMPR W6INXL12FT SMOOTH FOR LIMB EXSANG ESMARCH

## (undated) DEVICE — TUBING, SUCTION, 1/4" X 10', STRAIGHT: Brand: MEDLINE

## (undated) DEVICE — TUBING PMP L16FT MAIN DISP FOR AR-6400 AR-6475

## (undated) DEVICE — KNEE ARTHROSCOPY DR KOCH: Brand: MEDLINE INDUSTRIES, INC.

## (undated) DEVICE — WEREWOLF FLOW 90 COBLATION WAND: Brand: COBLATION

## (undated) DEVICE — SPONGE GZ W4XL4IN COT 12 PLY TYP VII WVN C FLD DSGN

## (undated) DEVICE — CARDINAL HEALTH FLEXIBLE LIGHT HANDLE COVER: Brand: CARDINAL HEALTH

## (undated) DEVICE — ZIMMER® STERILE DISPOSABLE TOURNIQUET CUFF WITH PROTECTIVE SLEEVE, DUAL PORT, SINGLE BLADDER, 34 IN. (86 CM)

## (undated) DEVICE — BLADE SHV L13CM DIA4MM CVD EXCALIBUR AGG COOLCUT

## (undated) DEVICE — PADDING CAST W4INXL4YD ST COT COHESIVE HND TEARABLE SPEC

## (undated) DEVICE — GOWN,PREVENTION PLUS,XL,ST,24/CS: Brand: MEDLINE

## (undated) DEVICE — CANNULA ARTHSCP L3CM ID8MM DBL DAM 1 PC MOLD LO PROF FLNG

## (undated) DEVICE — SOLUTION IRRIG 3000ML 0.9% SOD CHL USP UROMATIC PLAS CONT